# Patient Record
Sex: MALE | Race: WHITE | NOT HISPANIC OR LATINO | Employment: FULL TIME | URBAN - METROPOLITAN AREA
[De-identification: names, ages, dates, MRNs, and addresses within clinical notes are randomized per-mention and may not be internally consistent; named-entity substitution may affect disease eponyms.]

---

## 2017-04-15 ENCOUNTER — APPOINTMENT (OUTPATIENT)
Dept: GENERAL RADIOLOGY | Facility: HOSPITAL | Age: 37
End: 2017-04-15

## 2017-04-15 ENCOUNTER — HOSPITAL ENCOUNTER (EMERGENCY)
Facility: HOSPITAL | Age: 37
Discharge: HOME | End: 2017-04-15
Attending: EMERGENCY MEDICINE | Admitting: EMERGENCY MEDICINE

## 2017-04-15 VITALS
RESPIRATION RATE: 18 BRPM | WEIGHT: 175 LBS | OXYGEN SATURATION: 98 % | BODY MASS INDEX: 23.7 KG/M2 | TEMPERATURE: 98.3 F | SYSTOLIC BLOOD PRESSURE: 120 MMHG | HEART RATE: 66 BPM | HEIGHT: 72 IN | DIASTOLIC BLOOD PRESSURE: 80 MMHG

## 2017-04-15 DIAGNOSIS — J06.9 UPPER RESPIRATORY TRACT INFECTION, UNSPECIFIED TYPE: Primary | ICD-10-CM

## 2017-04-15 PROCEDURE — 99283 EMERGENCY DEPT VISIT LOW MDM: CPT

## 2017-04-15 PROCEDURE — 71020 HC CHEST PA AND LATERAL: CPT

## 2017-04-15 PROCEDURE — 93010 ELECTROCARDIOGRAM REPORT: CPT | Performed by: INTERNAL MEDICINE

## 2017-04-15 PROCEDURE — 93005 ELECTROCARDIOGRAM TRACING: CPT

## 2017-04-15 NOTE — ED PROVIDER NOTES
EMERGENCY DEPARTMENT ENCOUNTER    CHIEF COMPLAINT  Chief Complaint: URI sx  History given by: patient  History limited by: nothing   Room Number: 05/05  PMD: No Known Provider      HPI:  Pt is a 37 y.o. male who presents with URI sx onset 1 week ago.  Patient also complains of congestion, cough, post-nasal drip, and chest pain onset 2 days ago.  Patient denies recent long trips. Pt was seen at [facility] 1 week ago and was started on antihistamine and sudafed. He returned 2 days ago after onset of chest pain and was started on Augmentin. He has had one dose of abx. Pt states his chest pain has worsened, and the pain is exacerbated by certain positions and movements.   Past Medical History of cholecystectomy     Duration: 1 week  Timing: constant   Location: generalized   Radiation: does not radiate   Quality: new   Intensity/Severity: moderate   Progression: worsening   Associated Symptoms: cough, post-nasal drip, congestion, chest pain  Aggravating Factors: none specified   Alleviating Factors: none specified   Previous Episodes: Yes  Treatment before arrival: antihistamine, sudafed     PAST MEDICAL HISTORY  Active Ambulatory Problems     Diagnosis Date Noted   • No Active Ambulatory Problems     Resolved Ambulatory Problems     Diagnosis Date Noted   • No Resolved Ambulatory Problems     No Additional Past Medical History       PAST SURGICAL HISTORY  Past Surgical History:   Procedure Laterality Date   • CHOLECYSTECTOMY         FAMILY HISTORY  History reviewed. No pertinent family history.    SOCIAL HISTORY  Social History     Social History   • Marital status:      Spouse name: N/A   • Number of children: N/A   • Years of education: N/A     Occupational History   • Not on file.     Social History Main Topics   • Smoking status: Never Smoker   • Smokeless tobacco: Not on file   • Alcohol use Yes      Comment: social   • Drug use: No   • Sexual activity: Not on file     Other Topics Concern   • Not on file      Social History Narrative   • No narrative on file         ALLERGIES  Review of patient's allergies indicates no known allergies.    REVIEW OF SYSTEMS  Review of Systems   Constitutional: Negative for chills and fever.   HENT: Positive for congestion and postnasal drip. Negative for sore throat.    Respiratory: Positive for cough. Negative for shortness of breath.    Cardiovascular: Positive for chest pain.   Gastrointestinal: Negative for nausea and vomiting.   Genitourinary: Negative for dysuria.   Musculoskeletal: Negative for back pain.   Skin: Negative for rash.   Neurological: Negative for dizziness.   Psychiatric/Behavioral: The patient is not nervous/anxious.        PHYSICAL EXAM  ED Triage Vitals   Temp Heart Rate Resp BP SpO2   04/15/17 0651 04/15/17 0651 04/15/17 0651 04/15/17 0657 04/15/17 0651   97 °F (36.1 °C) 83 18 135/88 99 %      Temp src Heart Rate Source Patient Position BP Location FiO2 (%)   04/15/17 0651 04/15/17 0651 -- -- --   Tympanic Monitor          Physical Exam   Constitutional: He is well-developed, well-nourished, and in no distress. No distress.   HENT:   Head: Atraumatic.   Mouth/Throat: Mucous membranes are normal.   Eyes: No scleral icterus.   Neck: Normal range of motion.   Cardiovascular: Normal rate, regular rhythm and normal heart sounds.    Pulmonary/Chest: Effort normal and breath sounds normal.   Musculoskeletal: Normal range of motion.   Neurological: He is alert.   Skin: Skin is warm and dry.   Psychiatric: Mood and affect normal.   Nursing note and vitals reviewed.      RADIOLOGY  XR Chest 2 View   Final Result   No focal pulmonary consolidation. Follow-up as clinical   indications persist.       This report was finalized on 4/15/2017 9:25 AM by Dr. Cristian Harley MD.            I ordered the above noted radiological studies and reviewed the images on the PACS system.       EKG    ekg was interpreted by Dr. Jimenez, see his note for interpretation      PROGRESS  "AND CONSULTS    0958: Rechecked pt. Pt is resting comfortably. Discussed unremarkable workup and plan for discharge. Pt understands and agrees with plan, and all questions were addressed.     1036: Reviewed pt's history and workup with Dr. Jimenez.  At bedside evaluation, they agree with the plan of care.    Reviewed implications of results, diagnosis, meds, responsibility to follow up, warning signs and symptoms of possible worsening, potential complications and reasons to return to ER with patient.  Discussed all results and noted any abnormalities with patient.  Discussed absolute need to recheck abnormalities with PCP.    Discussed plan for discharge, as there is no emergent indication for admission.  Pt is agreeable and understands need for follow up and repeat testing.  Pt is aware that discharge does not mean that nothing is wrong but it indicates no emergency is present.  Pt is discharged with instructions to follow up with primary care doctor to have their blood pressure rechecked.       DIAGNOSIS  Final diagnoses:   Upper respiratory tract infection, unspecified type       FOLLOW UP   [facility] REFERRAL SERVICE  [facility] [address] [phone]           COURSE & MEDICAL DECISION MAKING  Pertinent Imaging studies that were ordered and reviewed are noted above.  Results were reviewed/discussed with the patient and they were also made aware of online assess.     MEDICATIONS GIVEN IN ER  Medications - No data to display    /80  Pulse 66  Temp 98.3 °F (36.8 °C) (Oral)   Resp 18  Ht 72\" (182.9 cm)  Wt 175 lb (79.4 kg)  SpO2 98%  BMI 23.73 kg/m2      I personally reviewed the past medical history, past surgical history, social history, family history, current medications and allergies as they appear in this chart.  The scribe's note accurately reflects the work and decisions made by me.     I personally scribed for ZAFAR Bajwa on 4/15/2017 at 11:15 AM.  " Electronically signed by Yadi Renteria on 4/15/2017 at time 10:42 AM                Yadi Renteria  04/15/17 1115       Thao Lantigua, APRN  04/15/17 1129

## 2017-04-15 NOTE — ED PROVIDER NOTES
Pt presents to ED c/o of upper respiratory sx with chest pain onset 1 week which has progressively worsened. Pt was seen at an urgent care center about a week ago for the same, and returned two days ago at which point he was prescribed Augmentin. He did not receive a CXR there and is now concerned for pneumonia.    On exam pt appears alert and oriented, ANO x3, no distress, and RRR. CXR is negative acute, he is already prescribed Augmentin. I agree w/ plan to discharge.    EKG  EKG time: 0708  Rhythm/Rate: NSR, rate 70  No Acute Ischemia  Non-Specific ST-T changes  No old for comparison  CXR is negative  Interpreted Contemporaneously by me.  Independently viewed by me    I supervised care provided by the midlevel provider.    We have discussed this patient's history, physical exam, and treatment plan.   I have reviewed the note and personally saw and examined the patient and agree with the plan of care.    Documentation assistance provided by andrade Wooten for Dr. Jimenez.  Information recorded by the scribe was done at my direction and has been verified and validated by me.    Documentation assistance provided by andrade Machado for Dr. Jimenez.  Information recorded by the scribe was done at my direction and has been verified and validated by me.     Stephani Wooten  04/15/17 1050       Clemente Machado  04/15/17 1118       Ishmael Jimenez MD  04/15/17 1219

## 2017-04-15 NOTE — DISCHARGE INSTRUCTIONS
Continue current home medications, complete Augmentin  Rest, increase fluids  Tylenol or Motrin as needed  Follow up with PMD or clinic of choice in 5-7 days for recheck  Return to er for fever, vomiting,shortness of air, chest pain, worsening cough, or any new or worsening symptoms

## 2018-07-17 ENCOUNTER — APPOINTMENT (OUTPATIENT)
Dept: GENERAL RADIOLOGY | Facility: HOSPITAL | Age: 38
End: 2018-07-17

## 2018-07-17 ENCOUNTER — HOSPITAL ENCOUNTER (EMERGENCY)
Facility: HOSPITAL | Age: 38
Discharge: HOME | End: 2018-07-17
Attending: FAMILY MEDICINE | Admitting: FAMILY MEDICINE

## 2018-07-17 VITALS
HEIGHT: 73 IN | RESPIRATION RATE: 18 BRPM | HEART RATE: 64 BPM | SYSTOLIC BLOOD PRESSURE: 119 MMHG | WEIGHT: 183.6 LBS | TEMPERATURE: 97.7 F | BODY MASS INDEX: 24.33 KG/M2 | DIASTOLIC BLOOD PRESSURE: 76 MMHG | OXYGEN SATURATION: 99 %

## 2018-07-17 DIAGNOSIS — R07.89 ATYPICAL CHEST PAIN: Primary | ICD-10-CM

## 2018-07-17 LAB
ALBUMIN SERPL-MCNC: 4.6 G/DL (ref 3.5–5.2)
ALBUMIN/GLOB SERPL: 2.2 G/DL
ALP SERPL-CCNC: 58 U/L (ref 39–117)
ALT SERPL W P-5'-P-CCNC: 20 U/L (ref 1–41)
ANION GAP SERPL CALCULATED.3IONS-SCNC: 12.2 MMOL/L
AST SERPL-CCNC: 16 U/L (ref 1–40)
BASOPHILS # BLD AUTO: 0.02 10*3/MM3 (ref 0–0.2)
BASOPHILS NFR BLD AUTO: 0.3 % (ref 0–1.5)
BILIRUB SERPL-MCNC: 0.3 MG/DL (ref 0.1–1.2)
BUN BLD-MCNC: 11 MG/DL (ref 6–20)
BUN/CREAT SERPL: 13.9 (ref 7–25)
CALCIUM SPEC-SCNC: 8.7 MG/DL (ref 8.6–10.5)
CHLORIDE SERPL-SCNC: 102 MMOL/L (ref 98–107)
CO2 SERPL-SCNC: 26.8 MMOL/L (ref 22–29)
CREAT BLD-MCNC: 0.79 MG/DL (ref 0.76–1.27)
DEPRECATED RDW RBC AUTO: 42.3 FL (ref 37–54)
EOSINOPHIL # BLD AUTO: 0.15 10*3/MM3 (ref 0–0.7)
EOSINOPHIL NFR BLD AUTO: 2.4 % (ref 0.3–6.2)
ERYTHROCYTE [DISTWIDTH] IN BLOOD BY AUTOMATED COUNT: 13.2 % (ref 11.5–14.5)
GFR SERPL CREATININE-BSD FRML MDRD: 111 ML/MIN/1.73
GLOBULIN UR ELPH-MCNC: 2.1 GM/DL
GLUCOSE BLD-MCNC: 89 MG/DL (ref 65–99)
HCT VFR BLD AUTO: 43.4 % (ref 40.4–52.2)
HGB BLD-MCNC: 14.1 G/DL (ref 13.7–17.6)
IMM GRANULOCYTES # BLD: 0 10*3/MM3 (ref 0–0.03)
IMM GRANULOCYTES NFR BLD: 0 % (ref 0–0.5)
LYMPHOCYTES # BLD AUTO: 1.9 10*3/MM3 (ref 0.9–4.8)
LYMPHOCYTES NFR BLD AUTO: 30.4 % (ref 19.6–45.3)
MCH RBC QN AUTO: 28.8 PG (ref 27–32.7)
MCHC RBC AUTO-ENTMCNC: 32.5 G/DL (ref 32.6–36.4)
MCV RBC AUTO: 88.6 FL (ref 79.8–96.2)
MONOCYTES # BLD AUTO: 0.46 10*3/MM3 (ref 0.2–1.2)
MONOCYTES NFR BLD AUTO: 7.3 % (ref 5–12)
NEUTROPHILS # BLD AUTO: 3.73 10*3/MM3 (ref 1.9–8.1)
NEUTROPHILS NFR BLD AUTO: 59.6 % (ref 42.7–76)
PLATELET # BLD AUTO: 156 10*3/MM3 (ref 140–500)
PMV BLD AUTO: 11.4 FL (ref 6–12)
POTASSIUM BLD-SCNC: 3.7 MMOL/L (ref 3.5–5.2)
PROT SERPL-MCNC: 6.7 G/DL (ref 6–8.5)
RBC # BLD AUTO: 4.9 10*6/MM3 (ref 4.6–6)
SODIUM BLD-SCNC: 141 MMOL/L (ref 136–145)
TROPONIN T SERPL-MCNC: <0.01 NG/ML (ref 0–0.03)
WBC NRBC COR # BLD: 6.26 10*3/MM3 (ref 4.5–10.7)

## 2018-07-17 PROCEDURE — 71046 X-RAY EXAM CHEST 2 VIEWS: CPT

## 2018-07-17 PROCEDURE — 85025 COMPLETE CBC W/AUTO DIFF WBC: CPT | Performed by: FAMILY MEDICINE

## 2018-07-17 PROCEDURE — 80053 COMPREHEN METABOLIC PANEL: CPT | Performed by: FAMILY MEDICINE

## 2018-07-17 PROCEDURE — 93010 ELECTROCARDIOGRAM REPORT: CPT | Performed by: INTERNAL MEDICINE

## 2018-07-17 PROCEDURE — 84484 ASSAY OF TROPONIN QUANT: CPT | Performed by: FAMILY MEDICINE

## 2018-07-17 PROCEDURE — 99284 EMERGENCY DEPT VISIT MOD MDM: CPT

## 2018-07-17 PROCEDURE — 93005 ELECTROCARDIOGRAM TRACING: CPT | Performed by: FAMILY MEDICINE

## 2018-07-17 RX ORDER — LORAZEPAM 0.5 MG/1
0.5 TABLET ORAL EVERY 8 HOURS PRN
Qty: 12 TABLET | Refills: 0 | Status: SHIPPED | OUTPATIENT
Start: 2018-07-17 | End: 2018-10-02

## 2018-07-17 RX ORDER — FAMOTIDINE 20 MG/1
20 TABLET, FILM COATED ORAL 2 TIMES DAILY
COMMUNITY
End: 2018-10-02

## 2018-07-17 RX ORDER — ALPRAZOLAM 0.25 MG/1
0.5 TABLET ORAL ONCE
Status: COMPLETED | OUTPATIENT
Start: 2018-07-17 | End: 2018-07-17

## 2018-07-17 RX ORDER — IBUPROFEN 600 MG/1
600 TABLET ORAL EVERY 6 HOURS PRN
COMMUNITY
End: 2018-10-02

## 2018-07-17 RX ADMIN — ALPRAZOLAM 0.5 MG: 0.25 TABLET ORAL at 01:08

## 2018-08-17 ENCOUNTER — OFFICE VISIT CONVERTED (OUTPATIENT)
Dept: INTERNAL MEDICINE | Facility: CLINIC | Age: 38
End: 2018-08-17
Attending: INTERNAL MEDICINE

## 2018-09-13 ENCOUNTER — OFFICE VISIT CONVERTED (OUTPATIENT)
Dept: INTERNAL MEDICINE | Facility: CLINIC | Age: 38
End: 2018-09-13
Attending: INTERNAL MEDICINE

## 2018-10-01 ENCOUNTER — OFFICE VISIT CONVERTED (OUTPATIENT)
Dept: INTERNAL MEDICINE | Facility: CLINIC | Age: 38
End: 2018-10-01
Attending: INTERNAL MEDICINE

## 2018-10-02 ENCOUNTER — APPOINTMENT (OUTPATIENT)
Dept: GENERAL RADIOLOGY | Facility: HOSPITAL | Age: 38
End: 2018-10-02

## 2018-10-02 ENCOUNTER — HOSPITAL ENCOUNTER (EMERGENCY)
Facility: HOSPITAL | Age: 38
Discharge: HOME | End: 2018-10-02
Attending: EMERGENCY MEDICINE | Admitting: EMERGENCY MEDICINE

## 2018-10-02 VITALS
WEIGHT: 175 LBS | BODY MASS INDEX: 23.19 KG/M2 | RESPIRATION RATE: 16 BRPM | HEIGHT: 73 IN | SYSTOLIC BLOOD PRESSURE: 124 MMHG | TEMPERATURE: 97.7 F | HEART RATE: 69 BPM | DIASTOLIC BLOOD PRESSURE: 84 MMHG | OXYGEN SATURATION: 100 %

## 2018-10-02 DIAGNOSIS — R07.89 ATYPICAL CHEST PAIN: Primary | ICD-10-CM

## 2018-10-02 DIAGNOSIS — K29.00 ACUTE GASTRITIS WITHOUT HEMORRHAGE, UNSPECIFIED GASTRITIS TYPE: ICD-10-CM

## 2018-10-02 LAB
ALBUMIN SERPL-MCNC: 4.8 G/DL (ref 3.5–5.2)
ALBUMIN/GLOB SERPL: 1.8 G/DL
ALP SERPL-CCNC: 70 U/L (ref 39–117)
ALT SERPL W P-5'-P-CCNC: 22 U/L (ref 1–41)
ANION GAP SERPL CALCULATED.3IONS-SCNC: 11 MMOL/L
AST SERPL-CCNC: 16 U/L (ref 1–40)
BASOPHILS # BLD AUTO: 0.01 10*3/MM3 (ref 0–0.2)
BASOPHILS NFR BLD AUTO: 0.2 % (ref 0–1.5)
BILIRUB SERPL-MCNC: 0.6 MG/DL (ref 0.1–1.2)
BUN BLD-MCNC: 16 MG/DL (ref 6–20)
BUN/CREAT SERPL: 18.2 (ref 7–25)
CALCIUM SPEC-SCNC: 9.4 MG/DL (ref 8.6–10.5)
CHLORIDE SERPL-SCNC: 101 MMOL/L (ref 98–107)
CO2 SERPL-SCNC: 26 MMOL/L (ref 22–29)
CREAT BLD-MCNC: 0.88 MG/DL (ref 0.76–1.27)
DEPRECATED RDW RBC AUTO: 40.8 FL (ref 37–54)
EOSINOPHIL # BLD AUTO: 0.06 10*3/MM3 (ref 0–0.7)
EOSINOPHIL NFR BLD AUTO: 1.1 % (ref 0.3–6.2)
ERYTHROCYTE [DISTWIDTH] IN BLOOD BY AUTOMATED COUNT: 12.8 % (ref 11.5–14.5)
GFR SERPL CREATININE-BSD FRML MDRD: 98 ML/MIN/1.73
GLOBULIN UR ELPH-MCNC: 2.6 GM/DL
GLUCOSE BLD-MCNC: 105 MG/DL (ref 65–99)
HCT VFR BLD AUTO: 45.4 % (ref 40.4–52.2)
HGB BLD-MCNC: 15.4 G/DL (ref 13.7–17.6)
HOLD SPECIMEN: NORMAL
HOLD SPECIMEN: NORMAL
IMM GRANULOCYTES # BLD: 0.01 10*3/MM3 (ref 0–0.03)
IMM GRANULOCYTES NFR BLD: 0.2 % (ref 0–0.5)
LYMPHOCYTES # BLD AUTO: 1.21 10*3/MM3 (ref 0.9–4.8)
LYMPHOCYTES NFR BLD AUTO: 22.4 % (ref 19.6–45.3)
MCH RBC QN AUTO: 29.6 PG (ref 27–32.7)
MCHC RBC AUTO-ENTMCNC: 33.9 G/DL (ref 32.6–36.4)
MCV RBC AUTO: 87.3 FL (ref 79.8–96.2)
MONOCYTES # BLD AUTO: 0.38 10*3/MM3 (ref 0.2–1.2)
MONOCYTES NFR BLD AUTO: 7.1 % (ref 5–12)
NEUTROPHILS # BLD AUTO: 3.73 10*3/MM3 (ref 1.9–8.1)
NEUTROPHILS NFR BLD AUTO: 69.2 % (ref 42.7–76)
PLATELET # BLD AUTO: 180 10*3/MM3 (ref 140–500)
PMV BLD AUTO: 11.7 FL (ref 6–12)
POTASSIUM BLD-SCNC: 4.3 MMOL/L (ref 3.5–5.2)
PROT SERPL-MCNC: 7.4 G/DL (ref 6–8.5)
RBC # BLD AUTO: 5.2 10*6/MM3 (ref 4.6–6)
SODIUM BLD-SCNC: 138 MMOL/L (ref 136–145)
TROPONIN T SERPL-MCNC: <0.01 NG/ML (ref 0–0.03)
WBC NRBC COR # BLD: 5.39 10*3/MM3 (ref 4.5–10.7)
WHOLE BLOOD HOLD SPECIMEN: NORMAL
WHOLE BLOOD HOLD SPECIMEN: NORMAL

## 2018-10-02 PROCEDURE — 93005 ELECTROCARDIOGRAM TRACING: CPT

## 2018-10-02 PROCEDURE — 99283 EMERGENCY DEPT VISIT LOW MDM: CPT

## 2018-10-02 PROCEDURE — 80053 COMPREHEN METABOLIC PANEL: CPT | Performed by: PHYSICIAN ASSISTANT

## 2018-10-02 PROCEDURE — 93010 ELECTROCARDIOGRAM REPORT: CPT | Performed by: INTERNAL MEDICINE

## 2018-10-02 PROCEDURE — 85025 COMPLETE CBC W/AUTO DIFF WBC: CPT | Performed by: PHYSICIAN ASSISTANT

## 2018-10-02 PROCEDURE — 84484 ASSAY OF TROPONIN QUANT: CPT | Performed by: PHYSICIAN ASSISTANT

## 2018-10-02 PROCEDURE — 93005 ELECTROCARDIOGRAM TRACING: CPT | Performed by: EMERGENCY MEDICINE

## 2018-10-02 PROCEDURE — 71046 X-RAY EXAM CHEST 2 VIEWS: CPT

## 2018-10-02 RX ORDER — SUCRALFATE 1 G/1
1 TABLET ORAL 4 TIMES DAILY
Qty: 16 TABLET | Refills: 0 | Status: SHIPPED | OUTPATIENT
Start: 2018-10-02 | End: 2021-07-12

## 2018-10-02 RX ORDER — OMEPRAZOLE 40 MG/1
40 CAPSULE, DELAYED RELEASE ORAL DAILY
Qty: 30 CAPSULE | Refills: 0 | Status: SHIPPED | OUTPATIENT
Start: 2018-10-02 | End: 2021-07-12

## 2018-10-02 RX ORDER — SODIUM CHLORIDE 0.9 % (FLUSH) 0.9 %
10 SYRINGE (ML) INJECTION AS NEEDED
Status: DISCONTINUED | OUTPATIENT
Start: 2018-10-02 | End: 2018-10-02 | Stop reason: HOSPADM

## 2018-10-02 NOTE — ED TRIAGE NOTES
Pt reports left sided anterior chest pain x1 month, he reports that last night it worsened. The patient reports that it has moved to his left axilla.

## 2018-10-02 NOTE — ED PROVIDER NOTES
" EMERGENCY DEPARTMENT ENCOUNTER    CHIEF COMPLAINT  Chief Complaint: CP  History given by: patient  History limited by: nothing  Room Number: 40/40  PMD: Allison Maurice MD      HPI:  Pt is a 38 y.o. male who presents complaining of waxing and waning anterior sternal chest pressure for the last month. Pt states that the discomfort radiated to his back. Pt states that the discomfort is worse when lying supine at night and is occasionally relieved with belching. Pt states that his CP became worse and localized to his left anterior chest last night but states that it never occurs while the patient is running, which she does several times a week 3-4 miles.  Pt states that he developed left axillary pain around 0900 this morning and states that he had an intermittent \"warm sensation\" in his LUE. Pt also complains of intermittent SOA since his discomfort began but that he has not had any in the last week. Pt denies cough, cold, fever, urinary symptoms, constipation, N/V or illicit drug use other than marijuana use. Pt denies family history of sudden death, CAD, cardiac stents or MI. Pt states that his PCP recently started him on Nexium for 2 weeks without relief.    Duration:  One month  Onset: gradual  Timing: waxing and waning  Location: originally anterior sternum, now left anterior chest  Radiation: none  Quality: pressure  Intensity/Severity: moderate  Progression: worsening  Associated Symptoms: SOA, left axillary discomfort, \"warm sensation\" in LUE  Aggravating Factors: lying supine at night  Alleviating Factors: belching  Previous Episodes: none mentioned    PAST MEDICAL HISTORY  Active Ambulatory Problems     Diagnosis Date Noted   • No Active Ambulatory Problems     Resolved Ambulatory Problems     Diagnosis Date Noted   • No Resolved Ambulatory Problems     No Additional Past Medical History       PAST SURGICAL HISTORY  Past Surgical History:   Procedure Laterality Date   • CHOLECYSTECTOMY   "       FAMILY HISTORY  History reviewed. No pertinent family history.    SOCIAL HISTORY  Social History     Social History   • Marital status:      Spouse name: N/A   • Number of children: N/A   • Years of education: N/A     Occupational History   • Not on file.     Social History Main Topics   • Smoking status: Never Smoker   • Smokeless tobacco: Never Used   • Alcohol use Yes      Comment: social   • Drug use: No   • Sexual activity: Not on file     Other Topics Concern   • Not on file     Social History Narrative   • No narrative on file       ALLERGIES  Patient has no known allergies.    REVIEW OF SYSTEMS  Review of Systems   Constitutional: Negative for chills and fever.   HENT: Negative for sore throat and trouble swallowing.    Eyes: Negative for visual disturbance.   Respiratory: Positive for shortness of breath (none in the last week). Negative for cough.    Cardiovascular: Positive for chest pain. Negative for leg swelling.   Gastrointestinal: Negative for abdominal pain, diarrhea and vomiting.   Endocrine: Negative.    Genitourinary: Negative for decreased urine volume and frequency.   Musculoskeletal: Positive for myalgias (left axillary pain). Negative for neck pain.        (+) warm sensation in LUE   Skin: Negative for rash.   Allergic/Immunologic: Negative.    Neurological: Negative for weakness and numbness.   Hematological: Negative.    Psychiatric/Behavioral: Negative.    All other systems reviewed and are negative.      PHYSICAL EXAM  ED Triage Vitals   Temp Heart Rate Resp BP SpO2   10/02/18 1256 10/02/18 1256 10/02/18 1310 10/02/18 1310 10/02/18 1256   97.7 °F (36.5 °C) 69 16 124/84 100 %      Temp src Heart Rate Source Patient Position BP Location FiO2 (%)   10/02/18 1256 -- -- -- --   Tympanic           Physical Exam   Constitutional: He is oriented to person, place, and time. He appears distressed.   HENT:   Head: Normocephalic and atraumatic.   Eyes: EOM are normal.   Neck: Normal  range of motion.   Cardiovascular: Normal rate, regular rhythm and normal heart sounds.    No murmur heard.  Pulses:       Radial pulses are 2+ on the right side, and 2+ on the left side.        Posterior tibial pulses are 2+ on the right side, and 2+ on the left side.   Pulmonary/Chest: Effort normal and breath sounds normal. No respiratory distress. He has no wheezes. He exhibits no tenderness.   Abdominal: Soft. Bowel sounds are normal. There is no tenderness. There is no rebound and no guarding.   Musculoskeletal: Normal range of motion. He exhibits no edema.   Neurological: He is alert and oriented to person, place, and time. He has normal sensation and normal strength.   Skin: Skin is warm and dry.   Psychiatric: Affect normal.   Nursing note and vitals reviewed.      LAB RESULTS  Lab Results (last 24 hours)     Procedure Component Value Units Date/Time    CBC & Differential [[id number]] Collected:  10/02/18 1312    Specimen:  Blood Updated:  10/02/18 1337    Narrative:       The following orders were created for panel order CBC & Differential.  Procedure                               Abnormality         Status                     ---------                               -----------         ------                     CBC Auto Differential[[id number]]         Normal              Final result                 Please view results for these tests on the individual orders.    Comprehensive Metabolic Panel [[id number]]  (Abnormal) Collected:  10/02/18 1312    Specimen:  Blood Updated:  10/02/18 1340     Glucose 105 (H) mg/dL      BUN 16 mg/dL      Creatinine 0.88 mg/dL      Sodium 138 mmol/L      Potassium 4.3 mmol/L      Chloride 101 mmol/L      CO2 26.0 mmol/L      Calcium 9.4 mg/dL      Total Protein 7.4 g/dL      Albumin 4.80 g/dL      ALT (SGPT) 22 U/L      AST (SGOT) 16 U/L      Alkaline Phosphatase 70 U/L      Total Bilirubin 0.6 mg/dL      eGFR Non African Amer 98 mL/min/1.73      Globulin 2.6 gm/dL      A/G Ratio  1.8 g/dL      BUN/Creatinine Ratio 18.2     Anion Gap 11.0 mmol/L     Troponin [[id number]]  (Normal) Collected:  10/02/18 1312    Specimen:  Blood Updated:  10/02/18 1340     Troponin T <0.010 ng/mL     Narrative:       Troponin T Reference Ranges:  Less than 0.03 ng/mL:    Negative for AMI  0.03 to 0.09 ng/mL:      Indeterminant for AMI  Greater than 0.09 ng/mL: Positive for AMI    CBC Auto Differential [[id number]]  (Normal) Collected:  10/02/18 1312    Specimen:  Blood Updated:  10/02/18 1337     WBC 5.39 10*3/mm3      RBC 5.20 10*6/mm3      Hemoglobin 15.4 g/dL      Hematocrit 45.4 %      MCV 87.3 fL      MCH 29.6 pg      MCHC 33.9 g/dL      RDW 12.8 %      RDW-SD 40.8 fl      MPV 11.7 fL      Platelets 180 10*3/mm3      Neutrophil % 69.2 %      Lymphocyte % 22.4 %      Monocyte % 7.1 %      Eosinophil % 1.1 %      Basophil % 0.2 %      Immature Grans % 0.2 %      Neutrophils, Absolute 3.73 10*3/mm3      Lymphocytes, Absolute 1.21 10*3/mm3      Monocytes, Absolute 0.38 10*3/mm3      Eosinophils, Absolute 0.06 10*3/mm3      Basophils, Absolute 0.01 10*3/mm3      Immature Grans, Absolute 0.01 10*3/mm3           I ordered the above labs and reviewed the results    RADIOLOGY  XR Chest 2 View   Final Result   No interval change or evidence for active disease in the   chest.       This report was finalized on 10/2/2018 2:35 PM by Dr. Ishmael Chatterjee M.D.               I ordered the above noted radiological studies. Interpreted by radiologist. Reviewed by me in PACS.       PROCEDURES  Procedures  EKG           EKG time: 1300  Rhythm/Rate: sinus bradycardia, 50's  P waves and OK: LAE, normal JASON  QRS, axis: unremarkable   ST and T waves: ST prominence diffusely (suspect early repolarization)     Interpreted Contemporaneously by me, independently viewed  unchanged compared to prior on 7/17/18      PROGRESS AND CONSULTS  ED Course as of Oct 02 1655   Tue Oct 02, 2018   1317 Left chest pressure x 1 month, sharp throb last  night, today discomfort in left axilla and LUE.  [KA]      ED Course User Index  [KA] Odessa Escobar PA     1604- Notified pt of his unremarkable lab results and his unchanged EKG. D/w pt that he is at low risk for CAD and that his pain is very unlikely to be cardiac in etiology. D/w pt that his symptoms seem to be more GI in etiology but that he may have pleurisy as well. Discussed the plan to discharge the pt home with prescriptions for carafate and prilosec and referrals to GI and cardiology. Pt understands and agrees with the plan, all questions answered.        MEDICAL DECISION MAKING  Results were reviewed/discussed with the patient and they were also made aware of online access. Pt also made aware that some labs, such as cultures, will not be resulted during ER visit and follow up with PMD is necessary.     MDM  Number of Diagnoses or Management Options     Amount and/or Complexity of Data Reviewed  Clinical lab tests: reviewed and ordered (Troponin=<0.010)  Tests in the radiology section of CPT®: ordered and reviewed (CXR shows nothing acute)  Tests in the medicine section of CPT®: ordered and reviewed (See EKG procedure note)  Decide to obtain previous medical records or to obtain history from someone other than the patient: yes  Review and summarize past medical records: yes (Pt was last seen in the ED on 7/17/18 for atypical chest pain. Pt had a negative cardiac work up at that time.)  Independent visualization of images, tracings, or specimens: yes    Patient Progress  Patient progress: stable        HEART Score (for prediction of 6-week risk of major adverse cardiac event) reviewed and/or performed as part of the patient evaluation and treatment planning process.  The result associated with this review/performance is: 1      DIAGNOSIS  Final diagnoses:   Atypical chest pain   Acute gastritis without hemorrhage, unspecified gastritis type       DISPOSITION  DISCHARGE    Patient discharged in stable  condition.    Reviewed implications of results, diagnosis, meds, responsibility to follow up, warning signs and symptoms of possible worsening, potential complications and reasons to return to ER.    Patient/Family voiced understanding of above instructions.    Discussed plan for discharge, as there is no emergent indication for admission. Patient referred to primary care provider for BP management due to today's BP. Pt/family is agreeable and understands need for follow up and repeat testing.  Pt is aware that discharge does not mean that nothing is wrong but it indicates no emergency is present that requires admission and they must continue care with follow-up as given below or physician of their choice.     FOLLOW-UP  Allison Maurice MD  [address]  [address]  [facility] [id number] [phone]    Schedule an appointment as soon as possible for a visit in 2 days  for further evaluation    [facility] CARDIOLOGY  [address]  [facility] [address] [phone]  Call in 3 days  As needed, If symptoms worsen    Carlos Brewer MD  [address]  [facility] [id number] [phone]    Schedule an appointment as soon as possible for a visit in 1 week           Medication List      New Prescriptions    omeprazole 40 MG capsule  Commonly known as:  priLOSEC  Take 1 capsule by mouth Daily.     sucralfate 1 g tablet  Commonly known as:  CARAFATE  Take 1 tablet by mouth 4 (Four) Times a Day.        Stop    famotidine 20 MG tablet  Commonly known as:  PEPCID     ibuprofen 600 MG tablet  Commonly known as:  ADVIL,MOTRIN     LORazepam 0.5 MG tablet  Commonly known as:  ATIVAN              Latest Documented Vital Signs:  As of 4:55 PM  BP- 124/84 HR- 69 Temp- 97.7 °F (36.5 °C) (Tympanic) O2 sat- 100%    --  Documentation assistance provided by andrade Shaikh for Dr. Carrillo.  Information recorded by the andrade was done at my direction and has  been verified and validated by me.     Sara Shaikh  10/02/18 1655       Charley Carrillo MD  10/03/18 1735

## 2018-10-02 NOTE — DISCHARGE INSTRUCTIONS
You are advised to follow closely with Dr. Maurice in 2-3 days for recheck, final results of lab work and imaging testing, and further testing/treatment as needed.    Avoid eating tomotoes, citrus, mint, caffeine, spicy or greasy food. Continue taking probiotics.    Please return to the emergency department immediately with chest pain different than usual for you, shortness of air, abdominal pain, persistent vomiting/fever, blood in emesis or stool, lightheadedness/fainting, problems with speech, one sided weakness/numbness, new incontinence, problems with vision, or for worsening of symptoms or other concerns.

## 2018-10-16 ENCOUNTER — OFFICE VISIT (OUTPATIENT)
Dept: CARDIOLOGY | Facility: CLINIC | Age: 38
End: 2018-10-16

## 2018-10-16 ENCOUNTER — OFFICE (OUTPATIENT)
Dept: URBAN - METROPOLITAN AREA CLINIC 66 | Facility: CLINIC | Age: 38
End: 2018-10-16

## 2018-10-16 VITALS
WEIGHT: 178 LBS | HEIGHT: 73 IN | DIASTOLIC BLOOD PRESSURE: 80 MMHG | SYSTOLIC BLOOD PRESSURE: 112 MMHG | HEART RATE: 61 BPM | BODY MASS INDEX: 23.59 KG/M2

## 2018-10-16 VITALS
SYSTOLIC BLOOD PRESSURE: 116 MMHG | DIASTOLIC BLOOD PRESSURE: 76 MMHG | HEART RATE: 64 BPM | HEIGHT: 73 IN | WEIGHT: 179 LBS

## 2018-10-16 DIAGNOSIS — R14.2 ERUCTATION: ICD-10-CM

## 2018-10-16 DIAGNOSIS — R07.2 PRECORDIAL PAIN: Primary | ICD-10-CM

## 2018-10-16 DIAGNOSIS — R07.89 OTHER CHEST PAIN: ICD-10-CM

## 2018-10-16 PROCEDURE — 99203 OFFICE O/P NEW LOW 30 MIN: CPT | Performed by: INTERNAL MEDICINE

## 2018-10-16 PROCEDURE — 99203 OFFICE O/P NEW LOW 30 MIN: CPT | Performed by: NURSE PRACTITIONER

## 2018-10-16 RX ORDER — MELOXICAM 15 MG/1
15 TABLET ORAL DAILY
Refills: 2 | COMMUNITY
Start: 2018-10-01 | End: 2021-07-12

## 2018-10-16 RX ORDER — ERGOCALCIFEROL 1.25 MG/1
50000 CAPSULE ORAL WEEKLY
Refills: 0 | COMMUNITY
Start: 2018-10-12 | End: 2021-07-12

## 2018-10-16 NOTE — PROGRESS NOTES
Subjective:     Encounter Date:10/16/2018      Patient ID: Enoch Goddard is a 38 y.o. male.    Chief Complaint: 10/16/2018  History of Present Illness    Dear Dr. Maurice,    Had the pleasure of seeing this patient in the office today for initial evaluation and consultation in follow-up from the emergency room visit on October 2.  This patient was seen twice in the emergency room with epigastric and substernal discomfort.  This is a dull burning discomfort that is been present the vast majority of the time for the last 2 months.  He thinks that maybe he had a total of 7 days where he did not have this discomfort.  The rest the time, he's had discomfort essentially all day long, sometimes worse and sometimes better.  It is not associated with activity or exercise.  He runs up to 3 or 4 miles and does not have any problem.  He also kayaks and that does not bring on the discomfort.  There is no chest wall tenderness.  No change with deep breath or cough.  He given omeprazole and that did not seem to modify the pain.  When he was in the emergency room recently he was given Carafate but his understanding was that he was supposed to stay off that medicine until he was seen by GI.  His appointment is this afternoon.    This patient has not experienced any feeling of palpitations, tachycardia or heart racing and no presyncope or syncope.  There has not been any problems with dizziness or lightheadedness.  There has not been any orthopnea or PND, and no problems with lower extremity edema.  This patient denies any shortness of breath at rest or with activity and has not had any wheezing.  This patient has not had any problems with unexplained nausea or vomiting. The patient has continued to perform daily activities of living without any specific problem or change in the level of activity.  This patient has not been recently hospitalized for any reason.    This patient has no known cardiac history.  This patient has no  history of coronary artery disease, congestive heart failure, rheumatic fever, rheumatic heart disease, congenital heart disease or heart murmur.  This patient has never required invasive cardiovascular evaluation.    The following portions of the patient's history were reviewed and updated as appropriate: allergies, current medications, past family history, past medical history, past social history, past surgical history and problem list.    Past Medical History:   Diagnosis Date   • Acute gastritis without hemorrhage    • Atypical chest pain        Past Surgical History:   Procedure Laterality Date   • CHOLECYSTECTOMY         Social History     Social History   • Marital status:      Spouse name: N/A   • Number of children: N/A   • Years of education: N/A     Occupational History   • Not on file.     Social History Main Topics   • Smoking status: Never Smoker   • Smokeless tobacco: Never Used      Comment: caff use   • Alcohol use Yes      Comment: social   • Drug use: No   • Sexual activity: Not on file     Other Topics Concern   • Not on file     Social History Narrative   • No narrative on file       Review of Systems   Constitution: Negative for chills, decreased appetite, fever and night sweats.   HENT: Negative for ear discharge, ear pain, hearing loss, nosebleeds and sore throat.    Eyes: Negative for blurred vision, double vision and pain.   Cardiovascular: Negative for cyanosis.   Respiratory: Negative for hemoptysis and sputum production.    Endocrine: Negative for cold intolerance and heat intolerance.   Hematologic/Lymphatic: Negative for adenopathy.   Skin: Negative for dry skin, itching, nail changes, rash and suspicious lesions.   Musculoskeletal: Negative for arthritis, gout, muscle cramps, muscle weakness, myalgias and neck pain.   Gastrointestinal: Negative for anorexia, bowel incontinence, constipation, diarrhea, dysphagia, hematemesis and jaundice.   Genitourinary: Negative for bladder  "incontinence, dysuria, flank pain, frequency, hematuria and nocturia.   Neurological: Negative for focal weakness, numbness, paresthesias and seizures.   Psychiatric/Behavioral: Negative for altered mental status, hallucinations, hypervigilance, suicidal ideas and thoughts of violence.   Allergic/Immunologic: Negative for persistent infections.       Procedures       Objective:     Vitals:    10/16/18 1055   BP: 112/80   Pulse: 61   Weight: 80.7 kg (178 lb)   Height: 185.4 cm (73\")         Physical Exam   Constitutional: He is oriented to person, place, and time. He appears well-developed and well-nourished. No distress.   HENT:   Head: Normocephalic and atraumatic.   Nose: Nose normal.   Mouth/Throat: Oropharynx is clear and moist.   Eyes: Pupils are equal, round, and reactive to light. Conjunctivae and EOM are normal. Right eye exhibits no discharge. Left eye exhibits no discharge.   Neck: Normal range of motion. Neck supple. No tracheal deviation present. No thyromegaly present.   Cardiovascular: Normal rate, regular rhythm, S1 normal, S2 normal, normal heart sounds and normal pulses.  Exam reveals no S3.    Pulmonary/Chest: Effort normal and breath sounds normal. No stridor. No respiratory distress. He exhibits no tenderness.   Abdominal: Soft. Bowel sounds are normal. He exhibits no distension and no mass. There is no tenderness. There is no rebound and no guarding.   Musculoskeletal: Normal range of motion. He exhibits no tenderness or deformity.   Lymphadenopathy:     He has no cervical adenopathy.   Neurological: He is alert and oriented to person, place, and time. He has normal reflexes.   Skin: Skin is warm and dry. No rash noted. He is not diaphoretic. No erythema.   Psychiatric: He has a normal mood and affect. Thought content normal.       Lab Review:             Performed        Assessment:          Diagnosis Plan   1. Precordial pain            Plan:       Chest discomfort-is not suggestive of a " cardiac etiology for chest discomfort.  He scheduled to follow-up with a gastroenterologist day, and I feel that is the appropriate next step.  I did ask for him to contact us if they're not successful in identifying the etiology of his chest discomfort and resolving it.  Thank you very much for allowing us to participate in the care of this pleasant patient.  Please don't hesitate to call if I can be of assistance in any way.      Current Outpatient Prescriptions:   •  meloxicam (MOBIC) 15 MG tablet, Take 15 mg by mouth Daily., Disp: , Rfl: 2  •  vitamin D (ERGOCALCIFEROL) 10068 units capsule capsule, Take 50,000 Units by mouth 1 (One) Time Per Week., Disp: , Rfl: 0  •  omeprazole (priLOSEC) 40 MG capsule, Take 1 capsule by mouth Daily., Disp: 30 capsule, Rfl: 0  •  sucralfate (CARAFATE) 1 g tablet, Take 1 tablet by mouth 4 (Four) Times a Day., Disp: 16 tablet, Rfl: 0         EMR Dragon/Transcription disclaimer:    Much of this encounter note is an electronic transcription/translation of spoken language to printed text. The electronic translation of spoken language may permit erroneous, or at times, nonsensical words or phrases to be inadvertently transcribed; Although I have reviewed the note for such errors, some may still exist.

## 2018-11-07 ENCOUNTER — OFFICE VISIT CONVERTED (OUTPATIENT)
Dept: INTERNAL MEDICINE | Facility: CLINIC | Age: 38
End: 2018-11-07
Attending: INTERNAL MEDICINE

## 2018-11-07 ENCOUNTER — CONVERSION ENCOUNTER (OUTPATIENT)
Dept: INTERNAL MEDICINE | Facility: CLINIC | Age: 38
End: 2018-11-07

## 2018-11-12 ENCOUNTER — OFFICE (OUTPATIENT)
Dept: URBAN - METROPOLITAN AREA PATHOLOGY 4 | Facility: PATHOLOGY | Age: 38
End: 2018-11-12

## 2018-11-12 ENCOUNTER — AMBULATORY SURGICAL CENTER (OUTPATIENT)
Dept: URBAN - METROPOLITAN AREA SURGERY 20 | Facility: SURGERY | Age: 38
End: 2018-11-12

## 2018-11-12 VITALS — HEIGHT: 73 IN

## 2018-11-12 DIAGNOSIS — K29.70 GASTRITIS, UNSPECIFIED, WITHOUT BLEEDING: ICD-10-CM

## 2018-11-12 DIAGNOSIS — K29.60 OTHER GASTRITIS WITHOUT BLEEDING: ICD-10-CM

## 2018-11-12 DIAGNOSIS — R14.2 ERUCTATION: ICD-10-CM

## 2018-11-12 DIAGNOSIS — R07.89 OTHER CHEST PAIN: ICD-10-CM

## 2018-11-12 DIAGNOSIS — K31.89 OTHER DISEASES OF STOMACH AND DUODENUM: ICD-10-CM

## 2018-11-12 DIAGNOSIS — K44.9 DIAPHRAGMATIC HERNIA WITHOUT OBSTRUCTION OR GANGRENE: ICD-10-CM

## 2018-11-12 LAB
GI HISTOLOGY: A. SELECT: (no result)
GI HISTOLOGY: PDF REPORT: (no result)

## 2018-11-12 PROCEDURE — 88305 TISSUE EXAM BY PATHOLOGIST: CPT | Performed by: INTERNAL MEDICINE

## 2018-11-12 PROCEDURE — 43239 EGD BIOPSY SINGLE/MULTIPLE: CPT | Performed by: INTERNAL MEDICINE

## 2019-03-08 ENCOUNTER — OFFICE VISIT CONVERTED (OUTPATIENT)
Dept: INTERNAL MEDICINE | Facility: CLINIC | Age: 39
End: 2019-03-08
Attending: PHYSICIAN ASSISTANT

## 2019-03-08 ENCOUNTER — CONVERSION ENCOUNTER (OUTPATIENT)
Dept: INTERNAL MEDICINE | Facility: CLINIC | Age: 39
End: 2019-03-08

## 2019-03-08 ENCOUNTER — HOSPITAL ENCOUNTER (OUTPATIENT)
Dept: OTHER | Facility: HOSPITAL | Age: 39
Discharge: HOME | End: 2019-03-08
Attending: PHYSICIAN ASSISTANT

## 2019-03-08 LAB
25(OH)D3 SERPL-MCNC: 22.8 NG/ML (ref 30–100)
ALBUMIN SERPL-MCNC: 4.9 G/DL (ref 3.5–5)
ALBUMIN/GLOB SERPL: 1.8 {RATIO} (ref 1.4–2.6)
ALP SERPL-CCNC: 72 U/L (ref 53–128)
ALT SERPL-CCNC: 21 U/L (ref 10–40)
ANION GAP SERPL CALC-SCNC: -9 MMOL/L (ref 8–19)
AST SERPL-CCNC: 18 U/L (ref 15–50)
BASOPHILS # BLD AUTO: 0.03 10*3/UL (ref 0–0.2)
BASOPHILS NFR BLD AUTO: 0.6 % (ref 0–3)
BILIRUB SERPL-MCNC: 0.57 MG/DL (ref 0.2–1.3)
BUN SERPL-MCNC: 13 MG/DL (ref 5–25)
BUN/CREAT SERPL: 14 {RATIO} (ref 6–20)
CALCIUM SERPL-MCNC: 10.1 MG/DL (ref 8.7–10.4)
CHLORIDE SERPL-SCNC: 112 MMOL/L (ref 99–111)
CONV ABS IMM GRAN: 0.01 10*3/UL (ref 0–0.2)
CONV CO2: 28 MMOL/L (ref 22–32)
CONV IMMATURE GRAN: 0.2 % (ref 0–1.8)
CONV TOTAL PROTEIN: 7.7 G/DL (ref 6.3–8.2)
CREAT UR-MCNC: 0.93 MG/DL (ref 0.7–1.2)
DEPRECATED RDW RBC AUTO: 41 FL (ref 35.1–43.9)
EOSINOPHIL # BLD AUTO: 0.11 10*3/UL (ref 0–0.7)
EOSINOPHIL # BLD AUTO: 2.1 % (ref 0–7)
ERYTHROCYTE [DISTWIDTH] IN BLOOD BY AUTOMATED COUNT: 12.8 % (ref 11.6–14.4)
GFR SERPLBLD BASED ON 1.73 SQ M-ARVRAT: >60 ML/MIN/{1.73_M2}
GLOBULIN UR ELPH-MCNC: 2.8 G/DL (ref 2–3.5)
GLUCOSE SERPL-MCNC: 63 MG/DL (ref 70–99)
HBA1C MFR BLD: 16.7 G/DL (ref 14–18)
HCT VFR BLD AUTO: 50.3 % (ref 42–52)
LYMPHOCYTES # BLD AUTO: 1.46 10*3/UL (ref 1–5)
MCH RBC QN AUTO: 29.2 PG (ref 27–31)
MCHC RBC AUTO-ENTMCNC: 33.2 G/DL (ref 33–37)
MCV RBC AUTO: 87.9 FL (ref 80–96)
MONOCYTES # BLD AUTO: 0.37 10*3/UL (ref 0.2–1.2)
MONOCYTES NFR BLD AUTO: 7 % (ref 3–10)
NEUTROPHILS # BLD AUTO: 3.3 10*3/UL (ref 2–8)
NEUTROPHILS NFR BLD AUTO: 62.4 % (ref 30–85)
NRBC CBCN: 0 % (ref 0–0.7)
OSMOLALITY SERPL CALC.SUM OF ELEC: 260 MOSM/KG (ref 273–304)
PLATELET # BLD AUTO: 184 10*3/UL (ref 130–400)
PMV BLD AUTO: 12.2 FL (ref 9.4–12.4)
POTASSIUM SERPL-SCNC: 4.6 MMOL/L (ref 3.5–5.3)
RBC # BLD AUTO: 5.72 10*6/UL (ref 4.7–6.1)
SODIUM SERPL-SCNC: 126 MMOL/L (ref 135–147)
VARIANT LYMPHS NFR BLD MANUAL: 27.7 % (ref 20–45)
WBC # BLD AUTO: 5.28 10*3/UL (ref 4.8–10.8)

## 2019-04-03 ENCOUNTER — HOSPITAL ENCOUNTER (OUTPATIENT)
Dept: OTHER | Facility: HOSPITAL | Age: 39
Discharge: HOME | End: 2019-04-03
Attending: PHYSICIAN ASSISTANT

## 2019-04-03 ENCOUNTER — OFFICE VISIT CONVERTED (OUTPATIENT)
Dept: INTERNAL MEDICINE | Facility: CLINIC | Age: 39
End: 2019-04-03
Attending: PHYSICIAN ASSISTANT

## 2019-04-03 LAB
ALBUMIN SERPL-MCNC: 5.1 G/DL (ref 3.5–5)
ALBUMIN/GLOB SERPL: 1.6 {RATIO} (ref 1.4–2.6)
ALP SERPL-CCNC: 73 U/L (ref 53–128)
ALT SERPL-CCNC: 26 U/L (ref 10–40)
ANION GAP SERPL CALC-SCNC: 17 MMOL/L (ref 8–19)
AST SERPL-CCNC: 22 U/L (ref 15–50)
BILIRUB SERPL-MCNC: 0.81 MG/DL (ref 0.2–1.3)
BUN SERPL-MCNC: 12 MG/DL (ref 5–25)
BUN/CREAT SERPL: 14 {RATIO} (ref 6–20)
CALCIUM SERPL-MCNC: 9.5 MG/DL (ref 8.7–10.4)
CHLORIDE SERPL-SCNC: 98 MMOL/L (ref 99–111)
CONV CO2: 29 MMOL/L (ref 22–32)
CONV TOTAL PROTEIN: 8.2 G/DL (ref 6.3–8.2)
CREAT UR-MCNC: 0.85 MG/DL (ref 0.7–1.2)
GFR SERPLBLD BASED ON 1.73 SQ M-ARVRAT: >60 ML/MIN/{1.73_M2}
GLOBULIN UR ELPH-MCNC: 3.1 G/DL (ref 2–3.5)
GLUCOSE SERPL-MCNC: 92 MG/DL (ref 70–99)
OSMOLALITY SERPL CALC.SUM OF ELEC: 289 MOSM/KG (ref 273–304)
POTASSIUM SERPL-SCNC: 4 MMOL/L (ref 3.5–5.3)
SODIUM SERPL-SCNC: 140 MMOL/L (ref 135–147)

## 2019-04-15 ENCOUNTER — OFFICE (OUTPATIENT)
Dept: URBAN - METROPOLITAN AREA CLINIC 66 | Facility: CLINIC | Age: 39
End: 2019-04-15

## 2019-04-15 VITALS
HEART RATE: 60 BPM | DIASTOLIC BLOOD PRESSURE: 70 MMHG | HEIGHT: 73 IN | WEIGHT: 175 LBS | SYSTOLIC BLOOD PRESSURE: 112 MMHG

## 2019-04-15 DIAGNOSIS — R10.84 GENERALIZED ABDOMINAL PAIN: ICD-10-CM

## 2019-04-15 DIAGNOSIS — K29.70 GASTRITIS, UNSPECIFIED, WITHOUT BLEEDING: ICD-10-CM

## 2019-04-15 DIAGNOSIS — K44.9 DIAPHRAGMATIC HERNIA WITHOUT OBSTRUCTION OR GANGRENE: ICD-10-CM

## 2019-04-15 PROCEDURE — 99213 OFFICE O/P EST LOW 20 MIN: CPT | Performed by: NURSE PRACTITIONER

## 2019-04-15 RX ORDER — PANTOPRAZOLE SODIUM 40 MG/1
40 TABLET, DELAYED RELEASE ORAL
Qty: 30 | Refills: 5 | Status: ACTIVE
Start: 2019-04-15

## 2019-04-15 RX ORDER — SUCRALFATE 1 G/1
4 TABLET ORAL
Qty: 120 | Refills: 1 | Status: ACTIVE
Start: 2019-04-15

## 2019-05-13 ENCOUNTER — HOSPITAL ENCOUNTER (OUTPATIENT)
Dept: GENERAL RADIOLOGY | Facility: HOSPITAL | Age: 39
Discharge: HOME | End: 2019-05-13

## 2019-05-21 ENCOUNTER — OFFICE (OUTPATIENT)
Dept: URBAN - METROPOLITAN AREA CLINIC 66 | Facility: CLINIC | Age: 39
End: 2019-05-21

## 2019-05-21 VITALS
HEART RATE: 67 BPM | SYSTOLIC BLOOD PRESSURE: 116 MMHG | HEIGHT: 73 IN | WEIGHT: 174 LBS | DIASTOLIC BLOOD PRESSURE: 71 MMHG

## 2019-05-21 DIAGNOSIS — K30 FUNCTIONAL DYSPEPSIA: ICD-10-CM

## 2019-05-21 DIAGNOSIS — N28.82 MEGALOURETER: ICD-10-CM

## 2019-05-21 DIAGNOSIS — R53.83 OTHER FATIGUE: ICD-10-CM

## 2019-05-21 DIAGNOSIS — N13.30 UNSPECIFIED HYDRONEPHROSIS: ICD-10-CM

## 2019-05-21 DIAGNOSIS — K29.70 GASTRITIS, UNSPECIFIED, WITHOUT BLEEDING: ICD-10-CM

## 2019-05-21 DIAGNOSIS — K44.9 DIAPHRAGMATIC HERNIA WITHOUT OBSTRUCTION OR GANGRENE: ICD-10-CM

## 2019-05-21 PROCEDURE — 99213 OFFICE O/P EST LOW 20 MIN: CPT | Performed by: NURSE PRACTITIONER

## 2019-05-21 RX ORDER — PANTOPRAZOLE SODIUM 40 MG/1
40 TABLET, DELAYED RELEASE ORAL
Qty: 30 | Refills: 5 | Status: ACTIVE
Start: 2019-04-15

## 2019-05-21 RX ORDER — SUCRALFATE 1 G/1
4 TABLET ORAL
Qty: 120 | Refills: 1 | Status: ACTIVE
Start: 2019-04-15

## 2019-06-13 ENCOUNTER — HOSPITAL ENCOUNTER (OUTPATIENT)
Dept: GENERAL RADIOLOGY | Facility: HOSPITAL | Age: 39
Discharge: HOME | End: 2019-06-13
Attending: UROLOGY

## 2019-06-13 ENCOUNTER — HOSPITAL ENCOUNTER (OUTPATIENT)
Dept: LAB | Facility: HOSPITAL | Age: 39
Discharge: HOME | End: 2019-06-13
Attending: UROLOGY

## 2019-06-13 LAB
ANION GAP SERPL CALC-SCNC: 18 MMOL/L (ref 8–19)
BUN SERPL-MCNC: 16 MG/DL (ref 5–25)
BUN/CREAT SERPL: 19 {RATIO} (ref 6–20)
CALCIUM SERPL-MCNC: 8.9 MG/DL (ref 8.7–10.4)
CHLORIDE SERPL-SCNC: 99 MMOL/L (ref 99–111)
CONV CO2: 25 MMOL/L (ref 22–32)
CREAT UR-MCNC: 0.84 MG/DL (ref 0.7–1.2)
GFR SERPLBLD BASED ON 1.73 SQ M-ARVRAT: >60 ML/MIN/{1.73_M2}
GLUCOSE SERPL-MCNC: 69 MG/DL (ref 70–99)
OSMOLALITY SERPL CALC.SUM OF ELEC: 284 MOSM/KG (ref 273–304)
POTASSIUM SERPL-SCNC: 4.5 MMOL/L (ref 3.5–5.3)
SODIUM SERPL-SCNC: 137 MMOL/L (ref 135–147)

## 2019-06-26 ENCOUNTER — HOSPITAL ENCOUNTER (OUTPATIENT)
Dept: GENERAL RADIOLOGY | Facility: HOSPITAL | Age: 39
Discharge: HOME | End: 2019-06-26
Attending: UROLOGY

## 2019-07-18 ENCOUNTER — OFFICE (OUTPATIENT)
Dept: URBAN - METROPOLITAN AREA CLINIC 66 | Facility: CLINIC | Age: 39
End: 2019-07-18

## 2019-07-18 VITALS
WEIGHT: 173 LBS | HEIGHT: 73 IN | HEART RATE: 66 BPM | DIASTOLIC BLOOD PRESSURE: 70 MMHG | SYSTOLIC BLOOD PRESSURE: 104 MMHG

## 2019-07-18 DIAGNOSIS — K30 FUNCTIONAL DYSPEPSIA: ICD-10-CM

## 2019-07-18 DIAGNOSIS — K29.70 GASTRITIS, UNSPECIFIED, WITHOUT BLEEDING: ICD-10-CM

## 2019-07-18 PROCEDURE — 99213 OFFICE O/P EST LOW 20 MIN: CPT | Performed by: INTERNAL MEDICINE

## 2019-09-17 ENCOUNTER — HOSPITAL ENCOUNTER (EMERGENCY)
Facility: HOSPITAL | Age: 39
Discharge: HOME | End: 2019-09-17
Attending: EMERGENCY MEDICINE | Admitting: EMERGENCY MEDICINE

## 2019-09-17 ENCOUNTER — APPOINTMENT (OUTPATIENT)
Dept: GENERAL RADIOLOGY | Facility: HOSPITAL | Age: 39
End: 2019-09-17

## 2019-09-17 ENCOUNTER — APPOINTMENT (OUTPATIENT)
Dept: CT IMAGING | Facility: HOSPITAL | Age: 39
End: 2019-09-17

## 2019-09-17 VITALS
DIASTOLIC BLOOD PRESSURE: 81 MMHG | OXYGEN SATURATION: 98 % | HEART RATE: 77 BPM | HEIGHT: 73 IN | SYSTOLIC BLOOD PRESSURE: 123 MMHG | TEMPERATURE: 97.5 F | RESPIRATION RATE: 16 BRPM | BODY MASS INDEX: 23.48 KG/M2

## 2019-09-17 DIAGNOSIS — R07.89 ATYPICAL CHEST PAIN: Primary | ICD-10-CM

## 2019-09-17 DIAGNOSIS — R51.9 ACUTE NONINTRACTABLE HEADACHE, UNSPECIFIED HEADACHE TYPE: ICD-10-CM

## 2019-09-17 LAB
ALBUMIN SERPL-MCNC: 5 G/DL (ref 3.5–5.2)
ALBUMIN/GLOB SERPL: 2.2 G/DL
ALP SERPL-CCNC: 72 U/L (ref 39–117)
ALT SERPL W P-5'-P-CCNC: 31 U/L (ref 1–41)
ANION GAP SERPL CALCULATED.3IONS-SCNC: 10.9 MMOL/L (ref 5–15)
AST SERPL-CCNC: 24 U/L (ref 1–40)
BASOPHILS # BLD AUTO: 0.02 10*3/MM3 (ref 0–0.2)
BASOPHILS NFR BLD AUTO: 0.4 % (ref 0–1.5)
BILIRUB SERPL-MCNC: 0.5 MG/DL (ref 0.2–1.2)
BUN BLD-MCNC: 12 MG/DL (ref 6–20)
BUN/CREAT SERPL: 15 (ref 7–25)
CALCIUM SPEC-SCNC: 9.1 MG/DL (ref 8.6–10.5)
CHLORIDE SERPL-SCNC: 103 MMOL/L (ref 98–107)
CO2 SERPL-SCNC: 28.1 MMOL/L (ref 22–29)
CREAT BLD-MCNC: 0.8 MG/DL (ref 0.76–1.27)
DEPRECATED RDW RBC AUTO: 38.3 FL (ref 37–54)
EOSINOPHIL # BLD AUTO: 0.06 10*3/MM3 (ref 0–0.4)
EOSINOPHIL NFR BLD AUTO: 1.1 % (ref 0.3–6.2)
ERYTHROCYTE [DISTWIDTH] IN BLOOD BY AUTOMATED COUNT: 12.1 % (ref 12.3–15.4)
GFR SERPL CREATININE-BSD FRML MDRD: 109 ML/MIN/1.73
GLOBULIN UR ELPH-MCNC: 2.3 GM/DL
GLUCOSE BLD-MCNC: 101 MG/DL (ref 65–99)
HCT VFR BLD AUTO: 47.3 % (ref 37.5–51)
HGB BLD-MCNC: 16.1 G/DL (ref 13–17.7)
IMM GRANULOCYTES # BLD AUTO: 0.01 10*3/MM3 (ref 0–0.05)
IMM GRANULOCYTES NFR BLD AUTO: 0.2 % (ref 0–0.5)
LIPASE SERPL-CCNC: 37 U/L (ref 13–60)
LYMPHOCYTES # BLD AUTO: 1.09 10*3/MM3 (ref 0.7–3.1)
LYMPHOCYTES NFR BLD AUTO: 19.6 % (ref 19.6–45.3)
MAGNESIUM SERPL-MCNC: 2.4 MG/DL (ref 1.6–2.6)
MCH RBC QN AUTO: 29.7 PG (ref 26.6–33)
MCHC RBC AUTO-ENTMCNC: 34 G/DL (ref 31.5–35.7)
MCV RBC AUTO: 87.1 FL (ref 79–97)
MONOCYTES # BLD AUTO: 0.36 10*3/MM3 (ref 0.1–0.9)
MONOCYTES NFR BLD AUTO: 6.5 % (ref 5–12)
NEUTROPHILS # BLD AUTO: 4.03 10*3/MM3 (ref 1.7–7)
NEUTROPHILS NFR BLD AUTO: 72.2 % (ref 42.7–76)
NRBC BLD AUTO-RTO: 0 /100 WBC (ref 0–0.2)
PLATELET # BLD AUTO: 178 10*3/MM3 (ref 140–450)
PMV BLD AUTO: 11 FL (ref 6–12)
POTASSIUM BLD-SCNC: 4.5 MMOL/L (ref 3.5–5.2)
PROT SERPL-MCNC: 7.3 G/DL (ref 6–8.5)
RBC # BLD AUTO: 5.43 10*6/MM3 (ref 4.14–5.8)
SODIUM BLD-SCNC: 142 MMOL/L (ref 136–145)
TROPONIN T SERPL-MCNC: <0.01 NG/ML (ref 0–0.03)
WBC NRBC COR # BLD: 5.57 10*3/MM3 (ref 3.4–10.8)

## 2019-09-17 PROCEDURE — 93010 ELECTROCARDIOGRAM REPORT: CPT | Performed by: INTERNAL MEDICINE

## 2019-09-17 PROCEDURE — 83690 ASSAY OF LIPASE: CPT | Performed by: EMERGENCY MEDICINE

## 2019-09-17 PROCEDURE — 99283 EMERGENCY DEPT VISIT LOW MDM: CPT

## 2019-09-17 PROCEDURE — 70450 CT HEAD/BRAIN W/O DYE: CPT

## 2019-09-17 PROCEDURE — 83735 ASSAY OF MAGNESIUM: CPT | Performed by: EMERGENCY MEDICINE

## 2019-09-17 PROCEDURE — 93005 ELECTROCARDIOGRAM TRACING: CPT

## 2019-09-17 PROCEDURE — 93005 ELECTROCARDIOGRAM TRACING: CPT | Performed by: EMERGENCY MEDICINE

## 2019-09-17 PROCEDURE — 80053 COMPREHEN METABOLIC PANEL: CPT | Performed by: EMERGENCY MEDICINE

## 2019-09-17 PROCEDURE — 84484 ASSAY OF TROPONIN QUANT: CPT | Performed by: EMERGENCY MEDICINE

## 2019-09-17 PROCEDURE — 71046 X-RAY EXAM CHEST 2 VIEWS: CPT

## 2019-09-17 PROCEDURE — 85025 COMPLETE CBC W/AUTO DIFF WBC: CPT | Performed by: EMERGENCY MEDICINE

## 2019-09-17 RX ORDER — SODIUM CHLORIDE 0.9 % (FLUSH) 0.9 %
10 SYRINGE (ML) INJECTION AS NEEDED
Status: DISCONTINUED | OUTPATIENT
Start: 2019-09-17 | End: 2019-09-17 | Stop reason: HOSPADM

## 2019-09-17 NOTE — ED PROVIDER NOTES
EMERGENCY DEPARTMENT ENCOUNTER    CHIEF COMPLAINT  Chief Complaint: Chest pain  History given by: Pt  History limited by: none  Room Number: 20/20  PMD: César Figueroa MD      HPI:  Pt is a 39 y.o. male who presents complaining of intermittent chest pain for the past 2-3 days. He states he has also had left arm pain. He denies any changes with exertion. He states he fell around a week ago down some steps and landed on his back. He denies head injury or LOC. He saw his PCP for the fall and was concerned he also hyperextended his left shoulder. He also states he has been having intermittent light-headedness, nausea, and a HA. He also states he has had frequent belching. He denies double vision, focal weakness, speech changes, or difficulty swallowing. He states he has been seen by Cardiology and GI in the past.     Duration:  2-3 days  Onset: gradual  Timing: intermittent  Location: left chest  Radiation: none  Quality: pain  Intensity/Severity: moderate  Progression: unchanged  Associated Symptoms: left arm pain, HA, light-headedness, nausea  Aggravating Factors: none  Alleviating Factors: none  Previous Episodes: none  Treatment before arrival: none    PAST MEDICAL HISTORY  Active Ambulatory Problems     Diagnosis Date Noted   • No Active Ambulatory Problems     Resolved Ambulatory Problems     Diagnosis Date Noted   • No Resolved Ambulatory Problems     Past Medical History:   Diagnosis Date   • Acute gastritis without hemorrhage    • Atypical chest pain        PAST SURGICAL HISTORY  Past Surgical History:   Procedure Laterality Date   • CHOLECYSTECTOMY         FAMILY HISTORY  Family History   Problem Relation Age of Onset   • Sudden death Paternal Aunt    • Cancer Maternal Grandfather    • Heart disease Paternal Grandfather 84       SOCIAL HISTORY  Social History     Socioeconomic History   • Marital status:      Spouse name: Not on file   • Number of children: Not on file   • Years of education: Not  on file   • Highest education level: Not on file   Tobacco Use   • Smoking status: Never Smoker   • Smokeless tobacco: Never Used   • Tobacco comment: caff use   Substance and Sexual Activity   • Alcohol use: Yes     Comment: social   • Drug use: No       ALLERGIES  Patient has no known allergies.    REVIEW OF SYSTEMS  Review of Systems   Constitutional: Negative for activity change, appetite change and fever.   HENT: Negative for congestion and sore throat.    Eyes: Negative.    Respiratory: Negative for cough and shortness of breath.    Cardiovascular: Positive for chest pain. Negative for leg swelling.   Gastrointestinal: Positive for nausea. Negative for abdominal pain, diarrhea and vomiting.   Endocrine: Negative.    Genitourinary: Negative for decreased urine volume and dysuria.   Musculoskeletal: Positive for myalgias (left arm). Negative for neck pain.   Skin: Negative for rash and wound.   Allergic/Immunologic: Negative.    Neurological: Positive for light-headedness and headaches. Negative for weakness and numbness.   Hematological: Negative.    Psychiatric/Behavioral: Negative.    All other systems reviewed and are negative.      PHYSICAL EXAM  ED Triage Vitals [09/17/19 1240]   Temp Heart Rate Resp BP SpO2   97.5 °F (36.4 °C) 80 16 -- 98 %      Temp src Heart Rate Source Patient Position BP Location FiO2 (%)   Tympanic Monitor -- -- --       Physical Exam   Constitutional: He is oriented to person, place, and time. No distress.   HENT:   Head: Normocephalic and atraumatic.   Eyes: EOM are normal. Pupils are equal, round, and reactive to light.   Neck: Normal range of motion. Neck supple.   Cardiovascular: Normal rate, regular rhythm, normal heart sounds and intact distal pulses.   Pulmonary/Chest: Effort normal and breath sounds normal. No respiratory distress. He has no wheezes. He has no rales.   Abdominal: Soft. Bowel sounds are normal. He exhibits no distension. There is no tenderness. There is no  rebound and no guarding.   Musculoskeletal: Normal range of motion. He exhibits no edema.   Neurological: He is alert and oriented to person, place, and time. He has normal sensation and normal strength. No cranial nerve deficit. Gait normal. Coordination normal.   Skin: Skin is warm and dry.   Psychiatric: Mood and affect normal.   Nursing note and vitals reviewed.      LAB RESULTS  Lab Results (last 24 hours)     Procedure Component Value Units Date/Time    CBC & Differential [[id number]] Collected:  09/17/19 1308    Specimen:  Blood Updated:  09/17/19 1328    Narrative:       The following orders were created for panel order CBC & Differential.  Procedure                               Abnormality         Status                     ---------                               -----------         ------                     CBC Auto Differential[[id number]]        Abnormal            Final result                 Please view results for these tests on the individual orders.    Comprehensive Metabolic Panel [[id number]]  (Abnormal) Collected:  09/17/19 1308    Specimen:  Blood Updated:  09/17/19 1352     Glucose 101 mg/dL      BUN 12 mg/dL      Creatinine 0.80 mg/dL      Sodium 142 mmol/L      Potassium 4.5 mmol/L      Chloride 103 mmol/L      CO2 28.1 mmol/L      Calcium 9.1 mg/dL      Total Protein 7.3 g/dL      Albumin 5.00 g/dL      ALT (SGPT) 31 U/L      AST (SGOT) 24 U/L      Alkaline Phosphatase 72 U/L      Total Bilirubin 0.5 mg/dL      eGFR Non African Amer 109 mL/min/1.73      Globulin 2.3 gm/dL      A/G Ratio 2.2 g/dL      BUN/Creatinine Ratio 15.0     Anion Gap 10.9 mmol/L     Narrative:       GFR Normal >60  Chronic Kidney Disease <60  Kidney Failure <15    Lipase [[id number]]  (Normal) Collected:  09/17/19 1308    Specimen:  Blood Updated:  09/17/19 1352     Lipase 37 U/L     Troponin [[id number]]  (Normal) Collected:  09/17/19 1308    Specimen:  Blood Updated:  09/17/19 1352     Troponin T <0.010 ng/mL      Narrative:       Troponin T Reference Range:  <= 0.03 ng/mL-   Negative for AMI  >0.03 ng/mL-     Abnormal for myocardial necrosis.  Clinicians would have to utilize clinical acumen, EKG, Troponin and serial changes to determine if it is an Acute Myocardial Infarction or myocardial injury due to an underlying chronic condition.     Magnesium [[id number]]  (Normal) Collected:  09/17/19 1308    Specimen:  Blood Updated:  09/17/19 1352     Magnesium 2.4 mg/dL     CBC Auto Differential [[id number]]  (Abnormal) Collected:  09/17/19 1308    Specimen:  Blood Updated:  09/17/19 1328     WBC 5.57 10*3/mm3      RBC 5.43 10*6/mm3      Hemoglobin 16.1 g/dL      Hematocrit 47.3 %      MCV 87.1 fL      MCH 29.7 pg      MCHC 34.0 g/dL      RDW 12.1 %      RDW-SD 38.3 fl      MPV 11.0 fL      Platelets 178 10*3/mm3      Neutrophil % 72.2 %      Lymphocyte % 19.6 %      Monocyte % 6.5 %      Eosinophil % 1.1 %      Basophil % 0.4 %      Immature Grans % 0.2 %      Neutrophils, Absolute 4.03 10*3/mm3      Lymphocytes, Absolute 1.09 10*3/mm3      Monocytes, Absolute 0.36 10*3/mm3      Eosinophils, Absolute 0.06 10*3/mm3      Basophils, Absolute 0.02 10*3/mm3      Immature Grans, Absolute 0.01 10*3/mm3      nRBC 0.0 /100 WBC           I ordered the above labs and reviewed the results    RADIOLOGY  CT Head Without Contrast   Final Result      XR Chest 2 View   Final Result      negative acute     I ordered the above noted radiological studies. Interpreted by radiologist. Discussed with radiologist (). Reviewed by me in PACS.       PROCEDURES  Procedures    EKG          EKG time: 1245  Rhythm/Rate: NSR, 65  P waves and TN: LAE  QRS, axis: LVH  ST and T waves: normal    Interpreted Contemporaneously by me, independently viewed  unchanged compared to prior 10/2/18      PROGRESS AND CONSULTS       1351  Per Dr. Greenwood, CT head is negative acute.     1450  Rechecked pt. Pt is resting comfortably in NAD. Notified pt of unremarkable test  results. Discussed the plan to discharge the pt home. I instructed the pt to f/u with his PCP. Pt understands and agrees with the plan, all questions answered.      MEDICAL DECISION MAKING  Results were reviewed/discussed with the patient and they were also made aware of online access. Pt also made aware that some labs, such as cultures, will not be resulted during ER visit and follow up with PMD is necessary.     MDM  Number of Diagnoses or Management Options     Amount and/or Complexity of Data Reviewed  Decide to obtain previous medical records or to obtain history from someone other than the patient: yes  Review and summarize past medical records: yes (seen in ER october 18th for CP and was referred to cardiology. The pt saw Dr. Louie in the office in october of 2018 and was referred to GI. I see no tests done.)           DIAGNOSIS  Final diagnoses:   Atypical chest pain   Acute nonintractable headache, unspecified headache type       DISPOSITION  DISCHARGE    Patient discharged in stable condition.    Reviewed implications of results, diagnosis, meds, responsibility to follow up, warning signs and symptoms of possible worsening, potential complications and reasons to return to ER.    Patient/Family voiced understanding of above instructions.    Discussed plan for discharge, as there is no emergent indication for admission. Patient referred to primary care provider for BP management due to today's BP. Pt/family is agreeable and understands need for follow up and repeat testing.  Pt is aware that discharge does not mean that nothing is wrong but it indicates no emergency is present that requires admission and they must continue care with follow-up as given below or physician of their choice.     FOLLOW-UP  César Figueroa MD  [facility] [address] [facility] [address] [phone]    Schedule an appointment as soon as possible for a visit   for continued evaluation of symptoms         Medication List      No changes  were made to your prescriptions during this visit.           Latest Documented Vital Signs:  As of 2:57 PM  BP- 123/81 HR- 77 Temp- 97.5 °F (36.4 °C) (Tympanic) O2 sat- 98%    --  Documentation assistance provided by andrade Kan for Dr. Ignacio.  Information recorded by the scribe was done at my direction and has been verified and validated by me.              Paulie Kan  09/17/19 [id number]       Nic Ignacio MD  09/17/19 [id number]

## 2020-09-03 ENCOUNTER — HOSPITAL ENCOUNTER (OUTPATIENT)
Dept: URGENT CARE | Facility: CLINIC | Age: 40
Discharge: HOME | End: 2020-09-03
Attending: PHYSICIAN ASSISTANT

## 2020-09-06 LAB — SARS-COV-2 RNA SPEC QL NAA+PROBE: NOT DETECTED

## 2021-05-15 VITALS
BODY MASS INDEX: 23.06 KG/M2 | TEMPERATURE: 98.2 F | DIASTOLIC BLOOD PRESSURE: 78 MMHG | OXYGEN SATURATION: 99 % | SYSTOLIC BLOOD PRESSURE: 108 MMHG | RESPIRATION RATE: 12 BRPM | HEIGHT: 73 IN | HEART RATE: 80 BPM | WEIGHT: 174 LBS

## 2021-05-16 VITALS
TEMPERATURE: 98.2 F | HEIGHT: 73 IN | HEART RATE: 69 BPM | OXYGEN SATURATION: 99 % | SYSTOLIC BLOOD PRESSURE: 113 MMHG | RESPIRATION RATE: 16 BRPM | WEIGHT: 176 LBS | BODY MASS INDEX: 23.33 KG/M2 | DIASTOLIC BLOOD PRESSURE: 64 MMHG

## 2021-05-16 VITALS
BODY MASS INDEX: 23.61 KG/M2 | HEIGHT: 73 IN | TEMPERATURE: 97.5 F | DIASTOLIC BLOOD PRESSURE: 69 MMHG | WEIGHT: 178.12 LBS | OXYGEN SATURATION: 100 % | RESPIRATION RATE: 16 BRPM | SYSTOLIC BLOOD PRESSURE: 109 MMHG | HEART RATE: 59 BPM

## 2021-05-16 VITALS
TEMPERATURE: 98.7 F | WEIGHT: 174.37 LBS | BODY MASS INDEX: 23.11 KG/M2 | DIASTOLIC BLOOD PRESSURE: 64 MMHG | RESPIRATION RATE: 16 BRPM | HEART RATE: 70 BPM | SYSTOLIC BLOOD PRESSURE: 122 MMHG | HEIGHT: 73 IN | OXYGEN SATURATION: 99 %

## 2021-05-16 VITALS
SYSTOLIC BLOOD PRESSURE: 113 MMHG | RESPIRATION RATE: 16 BRPM | WEIGHT: 180.06 LBS | HEART RATE: 82 BPM | BODY MASS INDEX: 23.86 KG/M2 | TEMPERATURE: 97.9 F | DIASTOLIC BLOOD PRESSURE: 62 MMHG | OXYGEN SATURATION: 99 % | HEIGHT: 73 IN

## 2021-05-16 VITALS
TEMPERATURE: 98.6 F | WEIGHT: 170 LBS | OXYGEN SATURATION: 98 % | RESPIRATION RATE: 12 BRPM | HEIGHT: 61 IN | SYSTOLIC BLOOD PRESSURE: 118 MMHG | BODY MASS INDEX: 32.1 KG/M2 | HEART RATE: 78 BPM | DIASTOLIC BLOOD PRESSURE: 76 MMHG

## 2021-07-26 PROCEDURE — 87081 CULTURE SCREEN ONLY: CPT | Performed by: NURSE PRACTITIONER

## 2021-07-30 ENCOUNTER — TELEPHONE (OUTPATIENT)
Dept: URGENT CARE | Facility: CLINIC | Age: 41
End: 2021-07-30

## 2021-07-30 NOTE — TELEPHONE ENCOUNTER
----- Message from JOSÉ ANTONIO Adams sent at 7/28/2021  9:59 AM EDT -----  Please call the patient regarding his negative result.

## 2022-05-06 ENCOUNTER — OFFICE VISIT (OUTPATIENT)
Dept: UROLOGY | Facility: CLINIC | Age: 42
End: 2022-05-06

## 2022-05-06 VITALS
HEIGHT: 73 IN | SYSTOLIC BLOOD PRESSURE: 117 MMHG | BODY MASS INDEX: 21.66 KG/M2 | WEIGHT: 163.4 LBS | DIASTOLIC BLOOD PRESSURE: 75 MMHG

## 2022-05-06 DIAGNOSIS — N43.3 HYDROCELE IN ADULT: Primary | ICD-10-CM

## 2022-05-06 LAB
BILIRUB BLD-MCNC: NEGATIVE MG/DL
CLARITY, POC: CLEAR
COLOR UR: YELLOW
EXPIRATION DATE: NORMAL
GLUCOSE UR STRIP-MCNC: NEGATIVE MG/DL
KETONES UR QL: NEGATIVE
LEUKOCYTE EST, POC: NEGATIVE
Lab: NORMAL
NITRITE UR-MCNC: NEGATIVE MG/ML
PH UR: 6 [PH] (ref 5–8)
PROT UR STRIP-MCNC: NEGATIVE MG/DL
RBC # UR STRIP: NEGATIVE /UL
SP GR UR: 1.02 (ref 1–1.03)
UROBILINOGEN UR QL: NORMAL

## 2022-05-06 PROCEDURE — 99202 OFFICE O/P NEW SF 15 MIN: CPT | Performed by: UROLOGY

## 2022-05-06 PROCEDURE — 81003 URINALYSIS AUTO W/O SCOPE: CPT | Performed by: UROLOGY

## 2022-05-06 RX ORDER — FAMOTIDINE 40 MG/1
40 TABLET, FILM COATED ORAL DAILY
COMMUNITY
Start: 2022-03-01 | End: 2022-09-02

## 2022-05-06 RX ORDER — DIPHENOXYLATE HYDROCHLORIDE AND ATROPINE SULFATE 2.5; .025 MG/1; MG/1
1 TABLET ORAL DAILY
COMMUNITY

## 2022-05-06 RX ORDER — CLINDAMYCIN HYDROCHLORIDE 150 MG/1
1 CAPSULE ORAL 3 TIMES DAILY
COMMUNITY
Start: 2022-05-02 | End: 2022-09-02

## 2022-05-06 NOTE — PROGRESS NOTES
"Chief Complaint  Establish Care (Hydrocele/varicocele)    Subjective          Enoch Goddard presents to [facility] referred for a scrotal mass.     History of Present Illness     Mr. Goddard is referred to for scrotal mass. He had an ultrasound of his scrotum which revealed a small left hydrocele, a small right varicocele, and a 7-mm slightly complex structure within the scrotal soft tissues, which was likely a subcutaneous lymph node or small abscess.    The patient reports that last Thursday, 04/28/2022, he got in bed and felt a swollen lump. He states that Friday, 04/29/2022, it was hurting and there was a lump in the back of his scrotum. The patient reports that he went to see Dr. Hopkins and he was told that he needed to get an ultrasound, which he got that day. He reports that by Saturday night, 04/30/2022, it was very sore. The patient reports that Monday, 05/02/2022, he called Dr. Hopkins, and by that time it had gone from a lump under the skin to about that hard under the skin. He reports that it started boiling. The patient stated it was lanced and he was started on antibiotics. He reports his pain has went down, but there is still some discomfort. [place]     A review of systems was completed and positive findings are noted in the HPI.      Objective   Vital Signs:  /75   Ht 185.4 cm (73\")   Wt 74.1 kg (163 lb 6.4 oz)   BMI 21.56 kg/m²        Physical Exam  Vitals and nursing note reviewed.   Constitutional:       Appearance: Normal appearance. He is well-developed.   Pulmonary:      Effort: Pulmonary effort is normal.      Breath sounds: Normal air entry.   Neurological:      Mental Status: He is alert and oriented to person, place, and time.      Motor: Motor function is intact.   Psychiatric:         Mood and Affect: Mood normal.         Behavior: Behavior normal.          Result Review :   The following data was reviewed by: Ani MCCULLOUGH" MD Jeninfer on 05/06/2022:    Data reviewed: Radiologic studies Scrotal ultrasound from [facility]          Assessment and Plan    Diagnoses and all orders for this visit:    1. Hydrocele in adult (Primary)  -     POC Urinalysis Dipstick, Automated     Assessment & Plan        -     We will see him back as needed.        -     Appears well healed, just a little bit of inflammation underneath it.        -     We did discuss hydrocele and varicocele, but no need for treatment  at this time.        - He will follow up as needed.         Follow Up   No follow-ups on file.  Patient was given instructions and counseling regarding his condition or for health maintenance advice. Please see specific information pulled into the AVS if appropriate.     Transcribed from ambient dictation for Ani Rodriguez MD by Mera Echeverria.  05/06/22   14:44 EDT    Patient verbalized consent to the visit recording.

## 2022-05-27 ENCOUNTER — TELEPHONE (OUTPATIENT)
Dept: UROLOGY | Facility: CLINIC | Age: 42
End: 2022-05-27

## 2022-05-27 NOTE — TELEPHONE ENCOUNTER
I cannot see him until next Wed.  He probably needs to go to the ER if his PCP cannot see him today.  He does not need to wait until next week to be seen.

## 2022-05-27 NOTE — TELEPHONE ENCOUNTER
Patient said he has a place that probably looked like ingrown hair at his appointment on 05/06/22 with Dr. Rodriguez.  It is at the top of base of penis.  It has gotten worse.  It has oozed some clear drainage.  It is painful.  He is having a hard time wearing pants.  He said he just finished a long round of antibiotics.  He is asking what Dr. Rodriguez thinks about it, and asking to be seen.      He said he had another place that had been lanced by PCP, prior to seeing Dr Rodriguez 05/06/22.  There is still a little lump under the skin.

## 2022-07-29 ENCOUNTER — TRANSCRIBE ORDERS (OUTPATIENT)
Dept: ADMINISTRATIVE | Facility: HOSPITAL | Age: 42
End: 2022-07-29

## 2022-07-29 DIAGNOSIS — R00.2 PALPITATIONS: Primary | ICD-10-CM

## 2022-08-18 ENCOUNTER — HOSPITAL ENCOUNTER (OUTPATIENT)
Dept: CARDIOLOGY | Facility: HOSPITAL | Age: 42
Discharge: HOME | End: 2022-08-18
Admitting: FAMILY MEDICINE

## 2022-08-18 DIAGNOSIS — R00.2 PALPITATIONS: ICD-10-CM

## 2022-08-18 PROCEDURE — 93225 XTRNL ECG REC<48 HRS REC: CPT

## 2022-08-25 LAB
MAXIMAL PREDICTED HEART RATE: 180 BPM
STRESS TARGET HR: 153 BPM

## 2022-08-25 PROCEDURE — 93227 XTRNL ECG REC<48 HR R&I: CPT | Performed by: INTERNAL MEDICINE

## 2022-09-02 PROCEDURE — U0004 COV-19 TEST NON-CDC HGH THRU: HCPCS | Performed by: FAMILY MEDICINE

## 2022-11-29 ENCOUNTER — OFFICE VISIT (OUTPATIENT)
Dept: NEUROSURGERY | Facility: CLINIC | Age: 42
End: 2022-11-29

## 2022-11-29 VITALS
HEIGHT: 73 IN | SYSTOLIC BLOOD PRESSURE: 128 MMHG | BODY MASS INDEX: 21.87 KG/M2 | WEIGHT: 165 LBS | DIASTOLIC BLOOD PRESSURE: 78 MMHG

## 2022-11-29 DIAGNOSIS — M51.16 LUMBAR DISC HERNIATION WITH RADICULOPATHY: Primary | ICD-10-CM

## 2022-11-29 PROCEDURE — 99204 OFFICE O/P NEW MOD 45 MIN: CPT | Performed by: NEUROLOGICAL SURGERY

## 2022-11-29 NOTE — PROGRESS NOTES
Subjective   History of Present Illness: Enoch Goddard is a 42 y.o. male is being seen for consultation today at the request of César Figueroa MD for right hip and leg pain that began around July 18th, 2022 without any known injury. He reports some weakness in his right leg. He does states he has intermittent tingling in his right foot and sometimes his anterior right thigh. He is unable to sit or stand for long periods of time due to severe cramping pain down the back of the leg settling in the calf. He has been to the chiropractor for 5 visits without improvement. He has started physical therapy and has only been to two visits so far although he got some partial improvement with traction that lasted a day or 2 but he was not pain-free.     While in the room and during my examination of the patient I wore a mask and eye protection.  I washed my hands before and after this patient encounter.  The patient was also wearing a mask.    Back Pain  The problem occurs constantly. The problem has been gradually worsening since onset. The pain is present in the lumbar spine. The quality of the pain is described as aching, burning, cramping, shooting and stabbing. The pain radiates to the right thigh, right foot and right knee. The symptoms are aggravated by position. Stiffness is present in the morning. Associated symptoms include numbness, tingling and weakness. Pertinent negatives include no chest pain. He has tried chiropractic manipulation (physical therapy) for the symptoms.       Tobacco Use: Low Risk    • Smoking Tobacco Use: Never   • Smokeless Tobacco Use: Never   • Passive Exposure: Not on file        The following portions of the patient's history were reviewed and updated as appropriate: allergies, current medications, past family history, past medical history, past social history, past surgical history and problem list.    Review of Systems   Respiratory: Negative for chest tightness and shortness of  "breath.    Cardiovascular: Negative for chest pain.   Musculoskeletal: Positive for back pain and gait problem.   Neurological: Positive for tingling, weakness and numbness.       Objective     Vitals:    11/29/22 1031   BP: 128/78   Weight: 74.8 kg (165 lb)   Height: 185.4 cm (73\")     Body mass index is 21.77 kg/m².      Physical Exam  Neurologic Exam    Physical Exam:    CONSTITUTIONAL: This 42 year old  male appears well developed, well-nourished and has to stand because he can barely sit down without having severe pain mostly in the right leg    HEAD & FACE: the head and face are symmetric, normocephalic and atraumatic.    EYES: Inspection of the conjunctivae and lids reveals no swelling, erythema or discharge.  Pupils are round, equal and reactive to light and there is no scleral icterus.    EARS, NOSE, MOUTH & THROAT: On inspection, the ears and nose are within normal limits.    NECK: the neck is supple and symmetric. The trachea is midline with no masses.    PULMONARY: Respiratory effort is normal with no increased work of breathing or signs of respiratory distress.    CARDIOVASCULAR: Pedal pulses are +1/4 bilaterally. Examination of the extremities shows no edema or varicosities.    MUSCULOSKELETAL: Gait and station reveal the patient is standing with a wide based stance slightly bent forward unable to sit comfortably. The spine has tenderness in the midline.  When the patient tries to sit on the exam table he basically has to lean back standing until his buttocks hits the exam table and then slide back still trying to keep his hips from flexing because of the pain that develops in his right leg.    SKIN: The skin is warm, dry and intact    NEUROLOGIC:   Cranial Nerves 2-12 intact  Normal motor strength noted confrontational testing. Muscle bulk and tone are normal.  Sensory exam is normal to fine touch to confrontational testing bilaterally  Reflexes on the right side demonstrates 2/4 Triceps " Reflex, 2/4 Biceps Reflex, 2/4 Brachioradialis Reflex, 2/4 Knee Jerk Reflex, 0/4 Ankle Jerk Reflex and no ankle clonus on the right.   Reflexes on the left side demonstrates 2/4 Triceps Reflex, 2/4 Biceps Reflex, 2/4 Brachioradialis Reflex, 2/4 Knee Jerk Reflex, 2/4 Ankle Jerk Reflex and no ankle clonus on the left.  Superficial/Primitive Reflexes: primitive reflexes were absent.  Hernández's, Babinski, and Clonus signs all negative.  No coordination deficit observed.  Radicular testing showed a negative London (JENN) test and markedly positive straight leg raise on the right and crossed positive on the left.  Cortical function is intact and without deficits. Speech is normal.    PSYCHIATRIC: oriented to person, place and time. Patient's mood and affect are normal.    Assessment & Plan   Independent Review of Radiographic Studies:      I personally reviewed the images from the following studies.    MRI of the lumbar spine done at [facility] on November 3, 2022 reveals a large right disc herniation at L5-S1 there is an isolated finding.    Medical Decision Making:      Patient has severe S1 radiculopathy in the right lower extremity that is been progressing since July 2023, 4 months ago.  He has tried conservative efforts with yoga exercises, chiropractic care, and most recently physical therapy with traction with only transient improvement.  His clinical exam documents the S1 radiculopathy with the loss of ankle reflex on the right and the S1 nerve root tingling subjectively.  Therefore I have suggested that since she has been hurting well over 6 weeks and not responded to conservative measures he is a candidate to consider a right L5-S1 lumbar microscopic discectomy.    A lumbar microscopic discectomy involves a small skin incision localized over the affected disc which is confirmed by X-ray. The natural space between lamina bones is widened with a drill and the nerve is gently retracted to expose the disc  abnormality. The fragments of disc or bone spurs are removed to make more room around the nerve then the skin is closed with sutures    The patient understands that there are inherent risks to surgery including bleeding, infection, anesthetic risk, death, heart attack, stroke, damage to nerves, weakness, numbness, paralysis, failure to improve, need for further surgery, CSF leak, recurrence, persistent pain, and any other unforeseen complications.  He and his father comprehend and had opportunity to ask further questions.    He is leaning towards considering surgery but wanted to try a few more visits of physical therapy before he completely consented and is going to continue therapy this week and the first of next week.  He wanted to try to schedule the surgery 2 weeks from now if he does not respond to the physical therapy in that period of time.  I told him I can justify surgery at this point giving the timing, neurologic deficit and the conservative treatment that he is already had but he wanted to try a few more visits of physical therapy.    Return for follow-up after surgery.    Diagnoses and all orders for this visit:    1. Lumbar disc herniation with radiculopathy (Primary)  -     Ambulatory Referral to Physical Therapy Evaluate and treat  -     Case Request; Standing  -     ceFAZolin (ANCEF) 2 g in sodium chloride 0.9 % 100 mL IVPB  -     Case Request    Other orders  -     Follow Anesthesia Guidelines / Protocol; Future  -     Obtain informed consent  -     Provide NPO Instructions to Patient; Future  -     Follow Anesthesia Guidelines / Protocol; Standing  -     Verify NPO Status; Standing  -     Obtain Informed Consent; Standing  -     SCD (sequential compression device)- to be placed on patient in Pre-op; Standing  -     Instructions on Coughing, Deep Breathing & Incentive Spirometry; Standing             Yogesh Moran MD FACS FAANS  Neurological Surgery

## 2022-12-02 ENCOUNTER — PATIENT ROUNDING (BHMG ONLY) (OUTPATIENT)
Dept: NEUROSURGERY | Facility: CLINIC | Age: 42
End: 2022-12-02

## 2022-12-05 ENCOUNTER — TELEPHONE (OUTPATIENT)
Dept: NEUROSURGERY | Facility: CLINIC | Age: 42
End: 2022-12-05

## 2022-12-05 NOTE — TELEPHONE ENCOUNTER
I spoke with Stefan Nitza, he was calling with concerns of his postoperative instructions. He reports he was unsure about the bending sitting and twisting instructions with having to get in and out of bed and or use the bathroom. I explained that he is able to get in and out of bed and use the bathroom but just being cautious and not doing them for prolong periods of time.

## 2022-12-09 ENCOUNTER — TELEPHONE (OUTPATIENT)
Dept: NEUROSURGERY | Facility: CLINIC | Age: 42
End: 2022-12-09

## 2022-12-09 ENCOUNTER — APPOINTMENT (OUTPATIENT)
Dept: PREADMISSION TESTING | Facility: HOSPITAL | Age: 42
End: 2022-12-09

## 2022-12-12 ENCOUNTER — TELEPHONE (OUTPATIENT)
Dept: NEUROSURGERY | Facility: CLINIC | Age: 42
End: 2022-12-12

## 2022-12-12 NOTE — TELEPHONE ENCOUNTER
Lebron from [employer] wants to know if we are aware that pt's surgery needs a peer 2 peer?  [phone] ext [phone]

## 2022-12-12 NOTE — TELEPHONE ENCOUNTER
Caller: AMANDEEP    Relationship to patient: [employer]    Best call back number: [phone]  Patient is needing: [employer] IS CALLING TO GET MISSING CLINICAL DOCUMENTATION TO AUTH SURGERY FOR [facility].    SHE STATES IT NEED TO BE SENT W/IN THE HOUR IN ORDER TO APPROVE AND COMPLETE PEER TO PEER    PLEASE CALL TO ADVISE     ATTEMPTED WT

## 2022-12-13 ENCOUNTER — TELEPHONE (OUTPATIENT)
Dept: NEUROSURGERY | Facility: CLINIC | Age: 42
End: 2022-12-13

## 2022-12-13 ENCOUNTER — PRE-ADMISSION TESTING (OUTPATIENT)
Dept: PREADMISSION TESTING | Facility: HOSPITAL | Age: 42
End: 2022-12-13

## 2022-12-13 VITALS
HEIGHT: 72 IN | BODY MASS INDEX: 22.21 KG/M2 | TEMPERATURE: 97 F | RESPIRATION RATE: 20 BRPM | OXYGEN SATURATION: 100 % | SYSTOLIC BLOOD PRESSURE: 129 MMHG | HEART RATE: 70 BPM | DIASTOLIC BLOOD PRESSURE: 77 MMHG | WEIGHT: 164 LBS

## 2022-12-13 LAB
ANION GAP SERPL CALCULATED.3IONS-SCNC: 11.6 MMOL/L (ref 5–15)
BUN SERPL-MCNC: 13 MG/DL (ref 6–20)
BUN/CREAT SERPL: 15.1 (ref 7–25)
CALCIUM SPEC-SCNC: 9.7 MG/DL (ref 8.6–10.5)
CHLORIDE SERPL-SCNC: 102 MMOL/L (ref 98–107)
CO2 SERPL-SCNC: 28.4 MMOL/L (ref 22–29)
CREAT SERPL-MCNC: 0.86 MG/DL (ref 0.76–1.27)
DEPRECATED RDW RBC AUTO: 41 FL (ref 37–54)
EGFRCR SERPLBLD CKD-EPI 2021: 112.3 ML/MIN/1.73
ERYTHROCYTE [DISTWIDTH] IN BLOOD BY AUTOMATED COUNT: 12.7 % (ref 12.3–15.4)
GLUCOSE SERPL-MCNC: 73 MG/DL (ref 65–99)
HCT VFR BLD AUTO: 46.2 % (ref 37.5–51)
HGB BLD-MCNC: 15.5 G/DL (ref 13–17.7)
MCH RBC QN AUTO: 29.4 PG (ref 26.6–33)
MCHC RBC AUTO-ENTMCNC: 33.5 G/DL (ref 31.5–35.7)
MCV RBC AUTO: 87.5 FL (ref 79–97)
PLATELET # BLD AUTO: 170 10*3/MM3 (ref 140–450)
PMV BLD AUTO: 11 FL (ref 6–12)
POTASSIUM SERPL-SCNC: 4.2 MMOL/L (ref 3.5–5.2)
QT INTERVAL: 395 MS
RBC # BLD AUTO: 5.28 10*6/MM3 (ref 4.14–5.8)
SODIUM SERPL-SCNC: 142 MMOL/L (ref 136–145)
WBC NRBC COR # BLD: 4.26 10*3/MM3 (ref 3.4–10.8)

## 2022-12-13 PROCEDURE — 93005 ELECTROCARDIOGRAM TRACING: CPT

## 2022-12-13 PROCEDURE — 85027 COMPLETE CBC AUTOMATED: CPT

## 2022-12-13 PROCEDURE — 80048 BASIC METABOLIC PNL TOTAL CA: CPT

## 2022-12-13 PROCEDURE — 36415 COLL VENOUS BLD VENIPUNCTURE: CPT

## 2022-12-13 PROCEDURE — 93010 ELECTROCARDIOGRAM REPORT: CPT | Performed by: INTERNAL MEDICINE

## 2022-12-13 RX ORDER — OMEPRAZOLE 40 MG/1
40 CAPSULE, DELAYED RELEASE ORAL NIGHTLY PRN
COMMUNITY

## 2022-12-13 RX ORDER — CYCLOBENZAPRINE HCL 10 MG
5 TABLET ORAL NIGHTLY
Status: ON HOLD | COMMUNITY
End: 2022-12-19 | Stop reason: SDUPTHER

## 2022-12-13 RX ORDER — VIT C/B6/B5/MAGNESIUM/HERB 173 50-5-6-5MG
1000 CAPSULE ORAL 2 TIMES DAILY
COMMUNITY

## 2022-12-13 NOTE — H&P
Subjective   History of Present Illness: Enoch Goddard is a 42 y.o. male is being seen today for surgical management to address the disc herniation causing right hip and leg pain that began around July 18th, 2022 without any known injury. He reports some weakness in his right leg. He does states he has intermittent tingling in his right foot and sometimes his anterior right thigh. He is unable to sit or stand for long periods of time due to severe cramping pain down the back of the leg settling in the calf. He has been to the chiropractor for 5 visits without improvement. He has started physical therapy and has only been to two visits so far although he got some partial improvement with traction that lasted a day or 2 but he was not pain-free.     This document is an updated document from my office visits formulated as a history and physical for the purposes of surgery.    While in the room and during my examination of the patient I wore a mask and eye protection.  I washed my hands before and after this patient encounter.  The patient was also wearing a mask.    Back Pain  The problem occurs constantly. The problem has been gradually worsening since onset. The pain is present in the lumbar spine. The quality of the pain is described as aching, burning, cramping, shooting and stabbing. The pain radiates to the right thigh, right foot and right knee. The symptoms are aggravated by position. Stiffness is present in the morning. Associated symptoms include numbness, tingling and weakness. Pertinent negatives include no chest pain. He has tried chiropractic manipulation (physical therapy) for the symptoms.       Tobacco Use: Low Risk    • Smoking Tobacco Use: Never   • Smokeless Tobacco Use: Never   • Passive Exposure: Not on file        The following portions of the patient's history were reviewed and updated as appropriate: allergies, current medications, past family history, past medical history, past social  "history, past surgical history and problem list.    Review of Systems   Respiratory: Negative for chest tightness and shortness of breath.    Cardiovascular: Negative for chest pain.   Musculoskeletal: Positive for back pain and gait problem.   Neurological: Positive for tingling, weakness and numbness.       Objective     Vitals:    11/29/22 1031   BP: 128/78   Weight: 74.8 kg (165 lb)   Height: 185.4 cm (73\")     Body mass index is 21.77 kg/m².      Physical Exam  Neurologic Exam    Physical Exam:    CONSTITUTIONAL: This 42 year old  male appears well developed, well-nourished and has to stand because he can barely sit down without having severe pain mostly in the right leg    HEAD & FACE: the head and face are symmetric, normocephalic and atraumatic.    EYES: Inspection of the conjunctivae and lids reveals no swelling, erythema or discharge.  Pupils are round, equal and reactive to light and there is no scleral icterus.    EARS, NOSE, MOUTH & THROAT: On inspection, the ears and nose are within normal limits.    NECK: the neck is supple and symmetric. The trachea is midline with no masses.    PULMONARY: Respiratory effort is normal with no increased work of breathing or signs of respiratory distress.    CARDIOVASCULAR: Pedal pulses are +1/4 bilaterally. Examination of the extremities shows no edema or varicosities.    MUSCULOSKELETAL: Gait and station reveal the patient is standing with a wide based stance slightly bent forward unable to sit comfortably. The spine has tenderness in the midline.  When the patient tries to sit on the exam table he basically has to lean back standing until his buttocks hits the exam table and then slide back still trying to keep his hips from flexing because of the pain that develops in his right leg.    SKIN: The skin is warm, dry and intact    NEUROLOGIC:   Cranial Nerves 2-12 intact  Normal motor strength noted confrontational testing. Muscle bulk and tone are " normal.  Sensory exam is normal to fine touch to confrontational testing bilaterally  Reflexes on the right side demonstrates 2/4 Triceps Reflex, 2/4 Biceps Reflex, 2/4 Brachioradialis Reflex, 2/4 Knee Jerk Reflex, 0/4 Ankle Jerk Reflex and no ankle clonus on the right.   Reflexes on the left side demonstrates 2/4 Triceps Reflex, 2/4 Biceps Reflex, 2/4 Brachioradialis Reflex, 2/4 Knee Jerk Reflex, 2/4 Ankle Jerk Reflex and no ankle clonus on the left.  Superficial/Primitive Reflexes: primitive reflexes were absent.  Hernández's, Babinski, and Clonus signs all negative.  No coordination deficit observed.  Radicular testing showed a negative London (JENN) test and markedly positive straight leg raise on the right and crossed positive on the left.  Cortical function is intact and without deficits. Speech is normal.    PSYCHIATRIC: oriented to person, place and time. Patient's mood and affect are normal.    Assessment & Plan   Independent Review of Radiographic Studies:      I personally reviewed the images from the following studies.    MRI of the lumbar spine done at [facility] on November 3, 2022 reveals a large right disc herniation at L5-S1 there is an isolated finding.    Medical Decision Making:      Patient has severe S1 radiculopathy in the right lower extremity that is been progressing since July 2023, 4 months ago.  He has tried conservative efforts with yoga exercises, chiropractic care, and most recently physical therapy with traction with only transient improvement.  His clinical exam documents the S1 radiculopathy with the loss of ankle reflex on the right and the S1 nerve root tingling subjectively.  Therefore I have suggested that since she has been hurting well over 6 weeks and not responded to conservative measures he is a candidate to consider a right L5-S1 lumbar microscopic discectomy.    A lumbar microscopic discectomy involves a small skin incision localized over the affected disc which is  confirmed by X-ray. The natural space between lamina bones is widened with a drill and the nerve is gently retracted to expose the disc abnormality. The fragments of disc or bone spurs are removed to make more room around the nerve then the skin is closed with sutures    The patient understands that there are inherent risks to surgery including bleeding, infection, anesthetic risk, death, heart attack, stroke, damage to nerves, weakness, numbness, paralysis, failure to improve, need for further surgery, CSF leak, recurrence, persistent pain, and any other unforeseen complications.  He and his father comprehend and had opportunity to ask further questions.    He was leaning towards considering surgery when I initially saw him but wanted to try a few more visits of physical therapy before he completely consented.  After continued attempt at physical therapy is elected to proceed with surgery given the persistent intractable symptoms into the right leg.    Return for follow-up after surgery.    Diagnoses and all orders for this visit:    1. Lumbar disc herniation with radiculopathy (Primary)  -     Ambulatory Referral to Physical Therapy Evaluate and treat  -     Case Request; Standing  -     ceFAZolin (ANCEF) 2 g in sodium chloride 0.9 % 100 mL IVPB  -     Case Request    Other orders  -     Follow Anesthesia Guidelines / Protocol; Future  -     Obtain informed consent  -     Provide NPO Instructions to Patient; Future  -     Follow Anesthesia Guidelines / Protocol; Standing  -     Verify NPO Status; Standing  -     Obtain Informed Consent; Standing  -     SCD (sequential compression device)- to be placed on patient in Pre-op; Standing  -     Instructions on Coughing, Deep Breathing & Incentive Spirometry; Standing             Yogesh Moran MD FACS FAANS  Neurological Surgery

## 2022-12-13 NOTE — DISCHARGE INSTRUCTIONS
Take the following medications the morning of surgery:    none    If you are on prescription narcotic pain medication to control your pain you may also take that medication the morning of surgery.    General Instructions:  Do not eat solid food after midnight the night before surgery.  You may drink clear liquids day of surgery but must stop at least one hour before your hospital arrival time.  It is beneficial for you to have a clear drink that contains carbohydrates the day of surgery.  We suggest a 12 to 20 ounce bottle of Gatorade or Powerade for non-diabetic patients or a 12 to 20 ounce bottle of G2 or Powerade Zero for diabetic patients. (Pediatric patients, are not advised to drink a 12 to 20 ounce carbohydrate drink)    Clear liquids are liquids you can see through.  Nothing red in color.     Plain water                               Sports drinks  Sodas                                   Gelatin (Jell-O)  Fruit juices without pulp such as white grape juice and apple juice  Popsicles that contain no fruit or yogurt  Tea or coffee (no cream or milk added)  Gatorade / Powerade  G2 / Powerade Zero    Infants may have breast milk up to four hours before surgery.  Infants drinking formula may drink formula up to six hours before surgery.   Patients who avoid smoking, chewing tobacco and alcohol for 4 weeks prior to surgery have a reduced risk of post-operative complications.  Quit smoking as many days before surgery as you can.  Do not smoke, use chewing tobacco or drink alcohol the day of surgery.   If applicable bring your C-PAP/ BI-PAP machine.  Bring any papers given to you in the doctor’s office.  Wear clean comfortable clothes.  Do not wear contact lenses, false eyelashes or make-up.  Bring a case for your glasses.   Bring crutches or walker if applicable.  Remove all piercings.  Leave jewelry and any other valuables at home.  Hair extensions with metal clips must be removed prior to surgery.  The  Pre-Admission Testing nurse will instruct you to bring medications if unable to obtain an accurate list in Pre-Admission Testing.        If you were given a blood bank ID arm band remember to bring it with you the day of surgery.    Preventing a Surgical Site Infection:  For 2 to 3 days before surgery, avoid shaving with a razor because the razor can irritate skin and make it easier to develop an infection.    Any areas of open skin can increase the risk of a post-operative wound infection by allowing bacteria to enter and travel throughout the body.  Notify your surgeon if you have any skin wounds / rashes even if it is not near the expected surgical site.  The area will need assessed to determine if surgery should be delayed until it is healed.  The night prior to surgery shower using a fresh bar of anti-bacterial soap (such as Dial) and clean washcloth.  Sleep in a clean bed with clean clothing.  Do not allow pets to sleep with you.  Shower on the morning of surgery using a fresh bar of anti-bacterial soap (such as Dial) and clean washcloth.  Dry with a clean towel and dress in clean clothing.  Ask your surgeon if you will be receiving antibiotics prior to surgery.  Make sure you, your family, and all healthcare providers clean their hands with soap and water or an alcohol based hand  before caring for you or your wound.    Day of surgery:12/14/2022   0900  Your arrival time is approximately two hours before your scheduled surgery time.  Upon arrival, a Pre-op nurse and Anesthesiologist will review your health history, obtain vital signs, and answer questions you may have.  The only belongings needed at this time will be a list of your home medications and if applicable your C-PAP/BI-PAP machine.  A Pre-op nurse will start an IV and you may receive medication in preparation for surgery, including something to help you relax.     Please be aware that surgery does come with discomfort.  We want to make  every effort to control your discomfort so please discuss any uncontrolled symptoms with your nurse.   Your doctor will most likely have prescribed pain medications.      If you are going home after surgery you will receive individualized written care instructions before being discharged.  A responsible adult must drive you to and from the hospital on the day of your surgery and stay with you for 24 hours.  Discharge prescriptions can be filled by the hospital pharmacy during regular pharmacy hours.  If you are having surgery late in the day/evening your prescription may be e-prescribed to your pharmacy.  Please verify your pharmacy hours or chose a 24 hour pharmacy to avoid not having access to your prescription because your pharmacy has closed for the day.    If you are staying overnight following surgery, you will be transported to your hospital room following the recovery period.  [facility] has all private rooms.    If you have any questions please call Pre-Admission Testing at [phone].  Deductibles and co-payments are collected on the day of service. Please be prepared to pay the required co-pay, deductible or deposit on the day of service as defined by your plan.    Call your surgeon immediately if you experience any of the following symptoms:  Sore Throat  Shortness of Breath or difficulty breathing  Cough  Chills  Body soreness or muscle pain  Headache  Fever  New loss of taste or smell  Do not arrive for your surgery ill.  Your procedure will need to be rescheduled to another time.  You will need to call your physician before the day of surgery to avoid any unnecessary exposure to hospital staff as well as other patients.

## 2022-12-19 ENCOUNTER — ANESTHESIA (OUTPATIENT)
Dept: PERIOP | Facility: HOSPITAL | Age: 42
End: 2022-12-19

## 2022-12-19 ENCOUNTER — APPOINTMENT (OUTPATIENT)
Dept: GENERAL RADIOLOGY | Facility: HOSPITAL | Age: 42
End: 2022-12-19

## 2022-12-19 ENCOUNTER — HOSPITAL ENCOUNTER (OUTPATIENT)
Facility: HOSPITAL | Age: 42
Setting detail: HOSPITAL OUTPATIENT SURGERY
Discharge: HOME | End: 2022-12-19
Attending: NEUROLOGICAL SURGERY | Admitting: NEUROLOGICAL SURGERY

## 2022-12-19 ENCOUNTER — TELEPHONE (OUTPATIENT)
Dept: NEUROSURGERY | Facility: CLINIC | Age: 42
End: 2022-12-19

## 2022-12-19 ENCOUNTER — ANESTHESIA EVENT (OUTPATIENT)
Dept: PERIOP | Facility: HOSPITAL | Age: 42
End: 2022-12-19

## 2022-12-19 VITALS
OXYGEN SATURATION: 99 % | SYSTOLIC BLOOD PRESSURE: 105 MMHG | TEMPERATURE: 97.8 F | HEART RATE: 75 BPM | RESPIRATION RATE: 16 BRPM | DIASTOLIC BLOOD PRESSURE: 72 MMHG

## 2022-12-19 DIAGNOSIS — M51.16 LUMBAR DISC HERNIATION WITH RADICULOPATHY: Primary | ICD-10-CM

## 2022-12-19 PROCEDURE — 25010000002 HYDROMORPHONE 1 MG/ML SOLUTION: Performed by: NURSE ANESTHETIST, CERTIFIED REGISTERED

## 2022-12-19 PROCEDURE — 25010000002 DEXAMETHASONE SODIUM PHOSPHATE 20 MG/5ML SOLUTION: Performed by: NURSE ANESTHETIST, CERTIFIED REGISTERED

## 2022-12-19 PROCEDURE — 25010000002 DROPERIDOL PER 5 MG: Performed by: NURSE ANESTHETIST, CERTIFIED REGISTERED

## 2022-12-19 PROCEDURE — 63030 LAMOT DCMPRN NRV RT 1 LMBR: CPT | Performed by: NEUROLOGICAL SURGERY

## 2022-12-19 PROCEDURE — 25010000002 MIDAZOLAM PER 1 MG: Performed by: ANESTHESIOLOGY

## 2022-12-19 PROCEDURE — 25010000002 NALOXONE PER 1 MG: Performed by: NURSE ANESTHETIST, CERTIFIED REGISTERED

## 2022-12-19 PROCEDURE — 25010000002 FENTANYL CITRATE (PF) 50 MCG/ML SOLUTION: Performed by: NURSE ANESTHETIST, CERTIFIED REGISTERED

## 2022-12-19 PROCEDURE — 25010000002 PROPOFOL 10 MG/ML EMULSION: Performed by: NURSE ANESTHETIST, CERTIFIED REGISTERED

## 2022-12-19 PROCEDURE — 25010000002 CEFAZOLIN IN DEXTROSE 2-4 GM/100ML-% SOLUTION: Performed by: NEUROLOGICAL SURGERY

## 2022-12-19 PROCEDURE — 25010000002 ONDANSETRON PER 1 MG: Performed by: NURSE ANESTHETIST, CERTIFIED REGISTERED

## 2022-12-19 PROCEDURE — 63030 LAMOT DCMPRN NRV RT 1 LMBR: CPT | Performed by: SPECIALIST/TECHNOLOGIST, OTHER

## 2022-12-19 PROCEDURE — 25010000002 KETOROLAC TROMETHAMINE PER 15 MG: Performed by: NURSE ANESTHETIST, CERTIFIED REGISTERED

## 2022-12-19 PROCEDURE — 72020 X-RAY EXAM OF SPINE 1 VIEW: CPT

## 2022-12-19 DEVICE — BONE WAX
Type: IMPLANTABLE DEVICE | Site: SPINE LUMBAR | Status: FUNCTIONAL
Brand: ETHICON

## 2022-12-19 DEVICE — FLOSEAL HEMOSTATIC MATRIX, 10ML
Type: IMPLANTABLE DEVICE | Site: SPINE LUMBAR | Status: FUNCTIONAL
Brand: FLOSEAL HEMOSTATIC MATRIX

## 2022-12-19 RX ORDER — DIPHENHYDRAMINE HCL 25 MG
25 CAPSULE ORAL
Status: DISCONTINUED | OUTPATIENT
Start: 2022-12-19 | End: 2022-12-19 | Stop reason: HOSPADM

## 2022-12-19 RX ORDER — ONDANSETRON 2 MG/ML
INJECTION INTRAMUSCULAR; INTRAVENOUS AS NEEDED
Status: DISCONTINUED | OUTPATIENT
Start: 2022-12-19 | End: 2022-12-19 | Stop reason: SURG

## 2022-12-19 RX ORDER — CYCLOBENZAPRINE HCL 10 MG
10 TABLET ORAL 3 TIMES DAILY PRN
Status: CANCELLED | OUTPATIENT
Start: 2022-12-19

## 2022-12-19 RX ORDER — DROPERIDOL 2.5 MG/ML
INJECTION, SOLUTION INTRAMUSCULAR; INTRAVENOUS AS NEEDED
Status: DISCONTINUED | OUTPATIENT
Start: 2022-12-19 | End: 2022-12-19 | Stop reason: SURG

## 2022-12-19 RX ORDER — PROMETHAZINE HYDROCHLORIDE 25 MG/1
25 TABLET ORAL ONCE AS NEEDED
Status: DISCONTINUED | OUTPATIENT
Start: 2022-12-19 | End: 2022-12-19 | Stop reason: HOSPADM

## 2022-12-19 RX ORDER — DEXAMETHASONE SODIUM PHOSPHATE 4 MG/ML
INJECTION, SOLUTION INTRA-ARTICULAR; INTRALESIONAL; INTRAMUSCULAR; INTRAVENOUS; SOFT TISSUE AS NEEDED
Status: DISCONTINUED | OUTPATIENT
Start: 2022-12-19 | End: 2022-12-19 | Stop reason: SURG

## 2022-12-19 RX ORDER — KETOROLAC TROMETHAMINE 30 MG/ML
15 INJECTION, SOLUTION INTRAMUSCULAR; INTRAVENOUS EVERY 6 HOURS PRN
Status: CANCELLED | OUTPATIENT
Start: 2022-12-19 | End: 2022-12-20

## 2022-12-19 RX ORDER — NALOXONE HYDROCHLORIDE 0.4 MG/ML
INJECTION, SOLUTION INTRAMUSCULAR; INTRAVENOUS; SUBCUTANEOUS AS NEEDED
Status: DISCONTINUED | OUTPATIENT
Start: 2022-12-19 | End: 2022-12-19 | Stop reason: SURG

## 2022-12-19 RX ORDER — LIDOCAINE HYDROCHLORIDE 20 MG/ML
INJECTION, SOLUTION INFILTRATION; PERINEURAL AS NEEDED
Status: DISCONTINUED | OUTPATIENT
Start: 2022-12-19 | End: 2022-12-19 | Stop reason: SURG

## 2022-12-19 RX ORDER — SODIUM CHLORIDE 0.9 % (FLUSH) 0.9 %
3-10 SYRINGE (ML) INJECTION AS NEEDED
Status: DISCONTINUED | OUTPATIENT
Start: 2022-12-19 | End: 2022-12-19 | Stop reason: HOSPADM

## 2022-12-19 RX ORDER — FAMOTIDINE 10 MG/ML
20 INJECTION, SOLUTION INTRAVENOUS ONCE
Status: COMPLETED | OUTPATIENT
Start: 2022-12-19 | End: 2022-12-19

## 2022-12-19 RX ORDER — BUPIVACAINE HYDROCHLORIDE AND EPINEPHRINE 5; 5 MG/ML; UG/ML
INJECTION, SOLUTION EPIDURAL; INTRACAUDAL; PERINEURAL AS NEEDED
Status: DISCONTINUED | OUTPATIENT
Start: 2022-12-19 | End: 2022-12-19 | Stop reason: HOSPADM

## 2022-12-19 RX ORDER — ONDANSETRON 2 MG/ML
4 INJECTION INTRAMUSCULAR; INTRAVENOUS EVERY 6 HOURS PRN
Status: CANCELLED | OUTPATIENT
Start: 2022-12-19

## 2022-12-19 RX ORDER — HYDROCODONE BITARTRATE AND ACETAMINOPHEN 7.5; 325 MG/1; MG/1
2 TABLET ORAL EVERY 4 HOURS PRN
Status: CANCELLED | OUTPATIENT
Start: 2022-12-19 | End: 2022-12-29

## 2022-12-19 RX ORDER — FENTANYL CITRATE 50 UG/ML
50 INJECTION, SOLUTION INTRAMUSCULAR; INTRAVENOUS
Status: DISCONTINUED | OUTPATIENT
Start: 2022-12-19 | End: 2022-12-19 | Stop reason: HOSPADM

## 2022-12-19 RX ORDER — HYDROCODONE BITARTRATE AND ACETAMINOPHEN 7.5; 325 MG/1; MG/1
1 TABLET ORAL ONCE AS NEEDED
Status: COMPLETED | OUTPATIENT
Start: 2022-12-19 | End: 2022-12-19

## 2022-12-19 RX ORDER — MIDAZOLAM HYDROCHLORIDE 1 MG/ML
1 INJECTION INTRAMUSCULAR; INTRAVENOUS
Status: COMPLETED | OUTPATIENT
Start: 2022-12-19 | End: 2022-12-19

## 2022-12-19 RX ORDER — ROCURONIUM BROMIDE 10 MG/ML
INJECTION, SOLUTION INTRAVENOUS AS NEEDED
Status: DISCONTINUED | OUTPATIENT
Start: 2022-12-19 | End: 2022-12-19 | Stop reason: SURG

## 2022-12-19 RX ORDER — HYDROMORPHONE HYDROCHLORIDE 1 MG/ML
0.5 INJECTION, SOLUTION INTRAMUSCULAR; INTRAVENOUS; SUBCUTANEOUS
Status: DISCONTINUED | OUTPATIENT
Start: 2022-12-19 | End: 2022-12-19 | Stop reason: HOSPADM

## 2022-12-19 RX ORDER — HYDRALAZINE HYDROCHLORIDE 20 MG/ML
5 INJECTION INTRAMUSCULAR; INTRAVENOUS
Status: DISCONTINUED | OUTPATIENT
Start: 2022-12-19 | End: 2022-12-19 | Stop reason: HOSPADM

## 2022-12-19 RX ORDER — SODIUM CHLORIDE 9 MG/ML
40 INJECTION, SOLUTION INTRAVENOUS AS NEEDED
Status: CANCELLED | OUTPATIENT
Start: 2022-12-19

## 2022-12-19 RX ORDER — ONDANSETRON 2 MG/ML
4 INJECTION INTRAMUSCULAR; INTRAVENOUS ONCE AS NEEDED
Status: DISCONTINUED | OUTPATIENT
Start: 2022-12-19 | End: 2022-12-19 | Stop reason: HOSPADM

## 2022-12-19 RX ORDER — SODIUM CHLORIDE 0.9 % (FLUSH) 0.9 %
3 SYRINGE (ML) INJECTION EVERY 12 HOURS SCHEDULED
Status: DISCONTINUED | OUTPATIENT
Start: 2022-12-19 | End: 2022-12-19 | Stop reason: HOSPADM

## 2022-12-19 RX ORDER — AMOXICILLIN 250 MG
1 CAPSULE ORAL NIGHTLY PRN
Status: CANCELLED | OUTPATIENT
Start: 2022-12-19

## 2022-12-19 RX ORDER — MORPHINE SULFATE 2 MG/ML
2 INJECTION, SOLUTION INTRAMUSCULAR; INTRAVENOUS EVERY 4 HOURS PRN
Status: CANCELLED | OUTPATIENT
Start: 2022-12-19 | End: 2022-12-29

## 2022-12-19 RX ORDER — ACETAMINOPHEN 325 MG/1
650 TABLET ORAL EVERY 4 HOURS PRN
Status: CANCELLED | OUTPATIENT
Start: 2022-12-19

## 2022-12-19 RX ORDER — NALOXONE HCL 0.4 MG/ML
0.2 VIAL (ML) INJECTION AS NEEDED
Status: DISCONTINUED | OUTPATIENT
Start: 2022-12-19 | End: 2022-12-19 | Stop reason: HOSPADM

## 2022-12-19 RX ORDER — KETOROLAC TROMETHAMINE 30 MG/ML
INJECTION, SOLUTION INTRAMUSCULAR; INTRAVENOUS AS NEEDED
Status: DISCONTINUED | OUTPATIENT
Start: 2022-12-19 | End: 2022-12-19 | Stop reason: SURG

## 2022-12-19 RX ORDER — FENTANYL CITRATE 50 UG/ML
INJECTION, SOLUTION INTRAMUSCULAR; INTRAVENOUS AS NEEDED
Status: DISCONTINUED | OUTPATIENT
Start: 2022-12-19 | End: 2022-12-19 | Stop reason: SURG

## 2022-12-19 RX ORDER — EPHEDRINE SULFATE 50 MG/ML
5 INJECTION, SOLUTION INTRAVENOUS ONCE AS NEEDED
Status: DISCONTINUED | OUTPATIENT
Start: 2022-12-19 | End: 2022-12-19 | Stop reason: HOSPADM

## 2022-12-19 RX ORDER — SODIUM CHLORIDE 0.9 % (FLUSH) 0.9 %
3 SYRINGE (ML) INJECTION EVERY 12 HOURS SCHEDULED
Status: CANCELLED | OUTPATIENT
Start: 2022-12-19

## 2022-12-19 RX ORDER — PROMETHAZINE HYDROCHLORIDE 25 MG/1
25 SUPPOSITORY RECTAL ONCE AS NEEDED
Status: DISCONTINUED | OUTPATIENT
Start: 2022-12-19 | End: 2022-12-19 | Stop reason: HOSPADM

## 2022-12-19 RX ORDER — HYDROCODONE BITARTRATE AND ACETAMINOPHEN 7.5; 325 MG/1; MG/1
1 TABLET ORAL EVERY 4 HOURS PRN
Status: CANCELLED | OUTPATIENT
Start: 2022-12-19 | End: 2022-12-29

## 2022-12-19 RX ORDER — PROPOFOL 10 MG/ML
VIAL (ML) INTRAVENOUS AS NEEDED
Status: DISCONTINUED | OUTPATIENT
Start: 2022-12-19 | End: 2022-12-19 | Stop reason: SURG

## 2022-12-19 RX ORDER — SODIUM CHLORIDE 0.9 % (FLUSH) 0.9 %
10 SYRINGE (ML) INJECTION AS NEEDED
Status: CANCELLED | OUTPATIENT
Start: 2022-12-19

## 2022-12-19 RX ORDER — HYDROCODONE BITARTRATE AND ACETAMINOPHEN 7.5; 325 MG/1; MG/1
1 TABLET ORAL EVERY 6 HOURS PRN
Qty: 40 TABLET | Refills: 0 | Status: SHIPPED | OUTPATIENT
Start: 2022-12-19 | End: 2023-01-31

## 2022-12-19 RX ORDER — ONDANSETRON 4 MG/1
4 TABLET, FILM COATED ORAL EVERY 6 HOURS PRN
Status: CANCELLED | OUTPATIENT
Start: 2022-12-19

## 2022-12-19 RX ORDER — LIDOCAINE HYDROCHLORIDE 10 MG/ML
0.5 INJECTION, SOLUTION EPIDURAL; INFILTRATION; INTRACAUDAL; PERINEURAL ONCE AS NEEDED
Status: DISCONTINUED | OUTPATIENT
Start: 2022-12-19 | End: 2022-12-19 | Stop reason: HOSPADM

## 2022-12-19 RX ORDER — FLUMAZENIL 0.1 MG/ML
0.2 INJECTION INTRAVENOUS AS NEEDED
Status: DISCONTINUED | OUTPATIENT
Start: 2022-12-19 | End: 2022-12-19 | Stop reason: HOSPADM

## 2022-12-19 RX ORDER — SODIUM CHLORIDE, SODIUM LACTATE, POTASSIUM CHLORIDE, CALCIUM CHLORIDE 600; 310; 30; 20 MG/100ML; MG/100ML; MG/100ML; MG/100ML
9 INJECTION, SOLUTION INTRAVENOUS CONTINUOUS
Status: DISCONTINUED | OUTPATIENT
Start: 2022-12-19 | End: 2022-12-19 | Stop reason: HOSPADM

## 2022-12-19 RX ORDER — CYCLOBENZAPRINE HCL 10 MG
5 TABLET ORAL NIGHTLY
Qty: 60 TABLET | Refills: 2 | Status: SHIPPED | OUTPATIENT
Start: 2022-12-19 | End: 2023-01-31

## 2022-12-19 RX ORDER — CEFAZOLIN SODIUM 2 G/100ML
2 INJECTION, SOLUTION INTRAVENOUS ONCE
Status: COMPLETED | OUTPATIENT
Start: 2022-12-19 | End: 2022-12-19

## 2022-12-19 RX ORDER — MAGNESIUM HYDROXIDE 1200 MG/15ML
LIQUID ORAL AS NEEDED
Status: DISCONTINUED | OUTPATIENT
Start: 2022-12-19 | End: 2022-12-19 | Stop reason: HOSPADM

## 2022-12-19 RX ORDER — LABETALOL HYDROCHLORIDE 5 MG/ML
5 INJECTION, SOLUTION INTRAVENOUS
Status: DISCONTINUED | OUTPATIENT
Start: 2022-12-19 | End: 2022-12-19 | Stop reason: HOSPADM

## 2022-12-19 RX ORDER — NALOXONE HCL 0.4 MG/ML
0.4 VIAL (ML) INJECTION
Status: CANCELLED | OUTPATIENT
Start: 2022-12-19

## 2022-12-19 RX ORDER — DIPHENHYDRAMINE HYDROCHLORIDE 50 MG/ML
12.5 INJECTION INTRAMUSCULAR; INTRAVENOUS
Status: DISCONTINUED | OUTPATIENT
Start: 2022-12-19 | End: 2022-12-19 | Stop reason: HOSPADM

## 2022-12-19 RX ORDER — OXYCODONE AND ACETAMINOPHEN 7.5; 325 MG/1; MG/1
1 TABLET ORAL EVERY 4 HOURS PRN
Status: DISCONTINUED | OUTPATIENT
Start: 2022-12-19 | End: 2022-12-19 | Stop reason: HOSPADM

## 2022-12-19 RX ORDER — SODIUM CHLORIDE AND POTASSIUM CHLORIDE 150; 900 MG/100ML; MG/100ML
75 INJECTION, SOLUTION INTRAVENOUS CONTINUOUS
Status: CANCELLED | OUTPATIENT
Start: 2022-12-19

## 2022-12-19 RX ADMIN — MIDAZOLAM 1 MG: 1 INJECTION INTRAMUSCULAR; INTRAVENOUS at 07:05

## 2022-12-19 RX ADMIN — SODIUM CHLORIDE, POTASSIUM CHLORIDE, SODIUM LACTATE AND CALCIUM CHLORIDE 9 ML/HR: 600; 310; 30; 20 INJECTION, SOLUTION INTRAVENOUS at 07:06

## 2022-12-19 RX ADMIN — FENTANYL CITRATE 50 MCG: 50 INJECTION, SOLUTION INTRAMUSCULAR; INTRAVENOUS at 09:58

## 2022-12-19 RX ADMIN — LIDOCAINE HYDROCHLORIDE 100 MG: 20 INJECTION, SOLUTION INFILTRATION; PERINEURAL at 07:36

## 2022-12-19 RX ADMIN — HYDROMORPHONE HYDROCHLORIDE 0.5 MG: 1 INJECTION, SOLUTION INTRAMUSCULAR; INTRAVENOUS; SUBCUTANEOUS at 08:20

## 2022-12-19 RX ADMIN — PROPOFOL 200 MG: 10 INJECTION, EMULSION INTRAVENOUS at 07:36

## 2022-12-19 RX ADMIN — FENTANYL CITRATE 50 MCG: 50 INJECTION, SOLUTION INTRAMUSCULAR; INTRAVENOUS at 07:36

## 2022-12-19 RX ADMIN — DEXAMETHASONE SODIUM PHOSPHATE 8 MG: 4 INJECTION, SOLUTION INTRAMUSCULAR; INTRAVENOUS at 07:54

## 2022-12-19 RX ADMIN — MIDAZOLAM 1 MG: 1 INJECTION INTRAMUSCULAR; INTRAVENOUS at 07:16

## 2022-12-19 RX ADMIN — DROPERIDOL 0.62 MG: 2.5 INJECTION, SOLUTION INTRAMUSCULAR; INTRAVENOUS at 09:06

## 2022-12-19 RX ADMIN — NALXONE HYDROCHLORIDE 0.08 MG: 0.4 INJECTION INTRAMUSCULAR; INTRAVENOUS; SUBCUTANEOUS at 09:01

## 2022-12-19 RX ADMIN — CEFAZOLIN SODIUM 2 G: 2 INJECTION, SOLUTION INTRAVENOUS at 07:25

## 2022-12-19 RX ADMIN — SUGAMMADEX 200 MG: 100 INJECTION, SOLUTION INTRAVENOUS at 08:45

## 2022-12-19 RX ADMIN — HYDROCODONE BITARTRATE AND ACETAMINOPHEN 1 TABLET: 7.5; 325 TABLET ORAL at 09:59

## 2022-12-19 RX ADMIN — ROCURONIUM BROMIDE 50 MG: 10 INJECTION, SOLUTION INTRAVENOUS at 07:36

## 2022-12-19 RX ADMIN — SODIUM CHLORIDE, POTASSIUM CHLORIDE, SODIUM LACTATE AND CALCIUM CHLORIDE 9 ML/HR: 600; 310; 30; 20 INJECTION, SOLUTION INTRAVENOUS at 09:42

## 2022-12-19 RX ADMIN — ONDANSETRON 4 MG: 2 INJECTION INTRAMUSCULAR; INTRAVENOUS at 08:41

## 2022-12-19 RX ADMIN — KETOROLAC TROMETHAMINE 30 MG: 30 INJECTION, SOLUTION INTRAMUSCULAR; INTRAVENOUS at 08:42

## 2022-12-19 RX ADMIN — FAMOTIDINE 20 MG: 10 INJECTION INTRAVENOUS at 07:05

## 2022-12-19 NOTE — DISCHARGE INSTRUCTIONS
[facility]  [address], [address]  [place] [address]  Phone:  [phone]  Fax:  [phone]    Dr. Yogesh Moran MD FACS FAANS            Low Back Surgery Post-Operative Instructions  (Lumbar Discectomy or Lumbar Decompression)        Change your bandage in about 24 hours following surgery and you may replace with another guaze bandage or leave it off.  Check the cut (incision) made by the surgeon twice a day for signs of infection.  Some signs of infection may include:  A foul smelling, greenish or yellowish discharge form the wound.  Increased pain  Increased redness over the incision (operative) site  Skin edges separate  Flu-like symptoms (problems)  A temperature above 101.5° F (38.6° C) .    You may resume normal diet as you tolerate    No lifting overhead, although you may place your arms above your head.  DO NOT lift anything over five (5) pounds.  Walking is allowed and encouraged. There are no restrictions on sitting or climbing stairs, but refrain from overly strenuous activity.  You may notice that a constant position (standing or sitting) greater than 45 minutes may tend to make you more sore and therefore it is recommended that you change positions frequently. Do not drive until you are seen back for your first postoperative visit, although you may be a passenger in a car.      Walking is permitted.  You may use a treadmill without an incline.  Back off on activity if you have discomfort.  You may also use stairs as much as you can tolerate, just take it slow and easy.    Refrain from any sexual activity until after your first evaluation at the office.     Back pain after surgery may worsen 2 to 3 days following surgery.  It should smooth out after the third day.  Continue the pain medication and muscle relaxers as prescribed.  You may also need to take a stool softener like Senokot-S if you develop constipation.    You may shower and pat the incision dry, but do not soak  your wound in water such as a swimming pool, hot tub or lake.   Avoid direct sunlight to the wound for at least three (3) months.  You may apply ice to the surgical site for 15 to 20 minutes each hour while awake for the first few days following surgery.  Simply put the ice in a plastic bag in place a towel between the bag of ice and your skin.    You have Steri-Strips applied to the skin edges of your incision.  They should fall off in 7 to 10 days, however, if they do not fall off by the 10th day you may remove them.    You will leave the hospital with prescriptions for pain medicine and a muscle relaxer.  These medications must be taken as prescribed only.  If a refill of your pain medication is required, in order to have this medication refilled, you must contact the office 3 days (72 hrs) prior to running out, but the amount prescribed is generally enough to last until the first post-operative visit.      A small amount of bleeding from the incision during the first few days is not unusual.       You should have a post-operative visit approximately 2 weeks after surgery.  If you do not have a scheduled appointment, call the office at [phone] to schedule one.  We will discuss return to work at your first post-operative visit, but you can expect to be off of work for an average of 6 weeks.     Notify the office if there are any problems, such as:    Excessive back or leg pain   If you lose control of urine or bowel movements  Persistent temperature of 101.5° F (38.6° C) or greater that is not relieved by Tylenol.  Excessive bleeding, redness, heat, leakage of fluid, swelling or pus around the incision   If you develop difficulty breathing, have chest pain or shortness of breath, call 911.  If you develop swelling in the calf or leg  Your pain is not controlled with medication  You seem to be getting worse rather than better    Thank you.

## 2022-12-19 NOTE — TELEPHONE ENCOUNTER
----- Message from Yogesh Moran MD sent at 12/19/2022 10:02 AM EST -----  Regarding: PT after first postop visit  Patient wanted assistance in scheduling PT immediately after his first postop visit because scheduling in PT is so backed up and the issues he had with insurance approvals.  I think they can do this themselves but they may need some direction or a PT order.

## 2022-12-19 NOTE — ANESTHESIA PROCEDURE NOTES
Airway  Urgency: elective    Date/Time: 12/19/2022 7:38 AM  Airway not difficult    General Information and Staff    Patient location during procedure: OR  CRNA/CAA: Jaye Dodson CRNA    Indications and Patient Condition  Indications for airway management: airway protection    Preoxygenated: yes  MILS not maintained throughout  Mask difficulty assessment: 1 - vent by mask    Final Airway Details  Final airway type: endotracheal airway      Successful airway: ETT  Cuffed: yes   Successful intubation technique: direct laryngoscopy  Endotracheal tube insertion site: oral  Blade: Saul  Blade size: 4  ETT size (mm): 7.5  Cormack-Lehane Classification: grade I - full view of glottis  Placement verified by: chest auscultation and capnometry   Measured from: lips  ETT/EBT  to lips (cm): 23  Number of attempts at approach: 1  Assessment: lips, teeth, and gum same as pre-op and atraumatic intubation    Additional Comments  Dentition intact and unchanged. CBEBS.  +ETCO2.

## 2022-12-19 NOTE — OP NOTE
Lumbar Microscopic Discectomy Procedure Note    Enoch Goddard  12/19/2022  [id number]    SURGEON  Yogesh Moran MD    Assistant:  Annette Wharton CSA was present throughout the entire surgery to provide intraoperative suction, retraction, suturing, and irrigation to promote visualization by the operative surgeon and assist with opening and closure.  The skilled assistance was necessary for the success of this case.  Our practice does not have a relationship with neurosurgical residents.    Indication:Intractable leg pain, S1 radiculopathy on the right    Pre-op Diagnosis:   Lumbar Herniated Nucleus Pulposus, L5-S1 on the Right    Post-op Diagnosis:     Lumbar Herniated Nucleus Pulposus, L5-S1 on the Right    Procedure Performed:  Lumbar Microscopic Discectomy, L5-S1 on the Right    1.  Laminotomy (Hemilaminectomy), with decompression of nerve root, including partial facetectomy, foraminotomy and excision of herniated intervertebral disc, L5-S1 on the Right.    2. Intraoperative Microdissection    Anesthesia: General    Staff:   Circulator: Ekta Barksdale RN  Radiology Technologist: Isabella Hughes  Scrub Person: Kim Locke  Assistant: Annette Wharton CSA    Estimated Blood Loss: minimal    Specimens: None     Drains: None    Findings: Large subannular disc herniation on the right at L5-S1 impinging the S1 nerve root into its neuroforamen    Complications: None immediate    Narrative Description:     Positioning:  After operative consent, the patient was taken to the operating room in stable condition and placed under general endotracheal intubation. Once adequate anesthesia was administered the patient was placed in the prone position on a Vernon frame, then the frame cranked into a flexion position. After adequate prepping and draping, needle localization of the L5-S1 level was confirmed and the skin was infiltrated with local anesthesia.    Approach:  The skin incision was taken down to the  superficial fascia which was then incised with the Bovie electrocautery. A subperiosteal dissection was performed down to the level of the facet joints. A Murray self-retaining retractor was placed in position on the right.    Operating Microscope: The operating microscope was draped using sterile technique and brought into the field and the rest of the operation was performed under operative magnification with microdissection technique and micro-illumination with the aid of the operating microscope.    Lumbar decompression:  The laminotomy was performed at L5 on the right with a pneumatic drill. The ligamentum flavum was resected with a Kerrison ronguer. The S1 nerve root was now decompressed and the microscope was now used for microdissection. Gentle palpation with a nerve hook along the S1 nerve root confirmed impingement by a large subannular disc herniation and after incising the annulus of the disc a large disc extrusion was removed from the right.  The neuroforamen and S1 nerve root have been decompressed following this maneuver and no residual stenosis was suspected.  Going through the annular defect with a micro pituitary rongeur additional loose nucleus pulposus material was removed to prevent early recurrence. The wound was irrigated and the Valsalva maneuver did not express CSF. The Coyne ball probe passed freely along the L5 and S1 nerve roots. Hemostasis was complete with Flow-Seal and bipolar electrocautery.    Closure:  The fascia was now closed with 0 Vicryl suture. The superficial subcutaneous tissues closed with 2-0 Vicryl suture and the skin with 3-0 Vicryl suture in a running subcuticular fashion. Iodine impregnated Steri-strips and a dry dressing was applied. The patient arose from anesthesia in stable condition and was transported to postop recovery.    Yogesh Moran MD     Date: 12/19/2022  Time: 09:03 EST

## 2022-12-19 NOTE — ANESTHESIA POSTPROCEDURE EVALUATION
Patient: Enoch Goddard    Procedure Summary     Date: 12/19/22 Room / Location: [facility] OR [address] [place] [address] MAIN OR    Anesthesia Start: 0729 Anesthesia Stop: 0910    Procedure: Lumbar Microscopic Discectomy, right lumbar 5 sacral 1 (Right: Spine Lumbar) Diagnosis:       Lumbar disc herniation with radiculopathy      (Lumbar disc herniation with radiculopathy [M51.16])    Surgeons: Yogesh Moran MD Provider: Maggie Duran MD    Anesthesia Type: general ASA Status: 3          Anesthesia Type: general    Vitals  Vitals Value Taken Time   /69 12/19/22 1031   Temp 36.6 °C (97.8 °F) 12/19/22 0908   Pulse 69 12/19/22 1040   Resp 16 12/19/22 1030   SpO2 97 % 12/19/22 1040   Vitals shown include unvalidated device data.        Post Anesthesia Care and Evaluation    Patient location during evaluation: PHASE II  Patient participation: complete - patient participated  Level of consciousness: awake  Pain score: 1  Pain management: satisfactory to patient  Anesthetic complications: No anesthetic complications  PONV Status: none  Cardiovascular status: acceptable  Respiratory status: acceptable  Hydration status: acceptable

## 2022-12-19 NOTE — ANESTHESIA PREPROCEDURE EVALUATION
Anesthesia Evaluation                  Airway   Mallampati: II  TM distance: >3 FB  Neck ROM: full  No difficulty expected  Dental - normal exam     Pulmonary - normal exam   Cardiovascular - normal exam    ECG reviewed        Neuro/Psych  (+) headaches, numbness,    GI/Hepatic/Renal/Endo    (+)  GERD,      Musculoskeletal     (+) back pain,   Abdominal    Substance History      OB/GYN          Other          Other Comment: Restless leg                  Anesthesia Plan    ASA 3     general     intravenous induction     Anesthetic plan, risks, benefits, and alternatives have been provided, discussed and informed consent has been obtained with: patient.        CODE STATUS:

## 2022-12-27 NOTE — PROGRESS NOTES
Subjective   History of Present Illness: Enoch Goddard is a 42 y.o. male is here today for follow-up. Mr. Goddard is 10 days out from having a right L5-S1 discectomy on 12-19-22.    Today, Mr. Goddard reports now 2 weeks status post lumbar microscopic discectomy on the right at L5-S1 he has tenderness in the back.  His right thigh continues to feel fairly tight.  He no longer takes the pain medication and prefers to take ibuprofen which does help relieve his pain.  He says within 2 days after surgery he felt so much better he had to hold himself back from doing more accelerated activity to maintain his activity within the rules following surgery.    While in the room and during my examination of the patient I wore a mask and eye protection.  I washed my hands before and after this patient encounter.  The patient was also wearing a mask.    History of Present Illness    Tobacco Use: Low Risk    • Smoking Tobacco Use: Never   • Smokeless Tobacco Use: Never   • Passive Exposure: Not on file        The following portions of the patient's history were reviewed and updated as appropriate: allergies, current medications, past family history, past medical history, past social history, past surgical history and problem list.    Review of Systems    Objective     Vitals:    12/29/22 1301   BP: 112/74   BP Location: Left arm   Patient Position: Sitting   Cuff Size: Adult   Pulse: 75   Temp: 97.5 °F (36.4 °C)   TempSrc: Temporal   SpO2: 98%   PainSc:   4   PainLoc: Back     There is no height or weight on file to calculate BMI.      Physical Exam  Neurologic Exam    Physical Exam:    CONSTITUTIONAL:  appears well developed, well-nourished and in no acute distress.    NECK: the neck is supple and symmetric. The trachea is midline with no masses.      PULMONARY: Respiratory effort is normal with no increased work of breathing or signs of respiratory distress.    CARDIOVASCULAR: Pedal pulses are +2/4 bilaterally. Examination  of the extremities shows no edema or varicosities.    MUSCULOSKELETAL: Gait normal    SKIN: The skin is warm, dry and intact.  Lumbar incision healing    NEUROLOGIC:   Normal motor strength noted. Muscle bulk and tone are normal.  Sensory exam is normal to fine touch  Straight leg raise test reveals right hamstring tightening only no sciatica  Reflexes are absent for both ankles as appropriate  Cortical function is intact and without deficits. Speech is normal.    PSYCHIATRIC: oriented to person, place and time. Patient's mood and affect are normal.    Assessment & Plan     Medical Decision Making:      The patient can now lift up to 25 lbs. and may soak the incision in a bath or pool if desired. They will arrange Physical Therapy 2-3 times per week for 3 weeks to initiate a home exercise plan. It is safe for the patient to drive if they are comfortable driving and not consuming narcotic pain medications. Follow up is arranged following PT. All questions were reviewed and answered.    Return in about 4 weeks (around 1/26/2023) for discussion of Physical Therapy results.    Diagnoses and all orders for this visit:    1. Status post lumbar surgery (Primary)  -     Ambulatory Referral to Physical Therapy Evaluate and treat    2. Lumbar disc herniation with radiculopathy  -     Ambulatory Referral to Physical Therapy Evaluate and treat             Yogesh Moran MD FACS FAANS  Neurological Surgery

## 2022-12-29 ENCOUNTER — OFFICE VISIT (OUTPATIENT)
Dept: NEUROSURGERY | Facility: CLINIC | Age: 42
End: 2022-12-29

## 2022-12-29 VITALS
DIASTOLIC BLOOD PRESSURE: 74 MMHG | OXYGEN SATURATION: 98 % | SYSTOLIC BLOOD PRESSURE: 112 MMHG | TEMPERATURE: 97.5 F | HEART RATE: 75 BPM

## 2022-12-29 DIAGNOSIS — M51.16 LUMBAR DISC HERNIATION WITH RADICULOPATHY: ICD-10-CM

## 2022-12-29 DIAGNOSIS — Z98.890 STATUS POST LUMBAR SURGERY: Primary | ICD-10-CM

## 2022-12-29 PROCEDURE — 99024 POSTOP FOLLOW-UP VISIT: CPT | Performed by: NEUROLOGICAL SURGERY

## 2022-12-29 RX ORDER — GLUCOSAMINE/MSM/CHONDROIT SULF 500-166.6
TABLET ORAL DAILY
COMMUNITY

## 2022-12-29 RX ORDER — IBUPROFEN 600 MG/1
600 TABLET ORAL EVERY 6 HOURS PRN
COMMUNITY

## 2023-01-11 ENCOUNTER — TELEPHONE (OUTPATIENT)
Dept: NEUROSURGERY | Facility: CLINIC | Age: 43
End: 2023-01-11
Payer: COMMERCIAL

## 2023-01-11 NOTE — TELEPHONE ENCOUNTER
I called and spoke with Chika and let her know, no lifting greater than 25 pounds. She voiced understanding.

## 2023-01-11 NOTE — TELEPHONE ENCOUNTER
Physical Therapy in [place] called stated patient did not know if he had any restrictions on PT . Please call Chika [phone] Please leave a VM if they do not answer.

## 2023-01-13 ENCOUNTER — TELEPHONE (OUTPATIENT)
Dept: NEUROSURGERY | Facility: CLINIC | Age: 43
End: 2023-01-13
Payer: COMMERCIAL

## 2023-01-13 RX ORDER — METHYLPREDNISOLONE 4 MG/1
TABLET ORAL
Qty: 1 EACH | Refills: 0 | Status: SHIPPED | OUTPATIENT
Start: 2023-01-13 | End: 2023-01-31

## 2023-01-13 NOTE — TELEPHONE ENCOUNTER
That is not unusual as you start getting more active the nerve is still bruised.  I will place Rx for Medrol dose pack if it doesn't improve over the next day or so he can try that.  I sent to [facility] in [facility] as I wasn't sure of the hours of his preferred [facility]

## 2023-01-13 NOTE — TELEPHONE ENCOUNTER
Patient called had a right L5-S1 discectomy on 12-19-22. He is calling to complain of right hip pain and leg pain. He did start PT yesterday. I explained that depending on how bad his nerves were compressed can depend on his recovery starting PT. He reports he has been more active around the house. He is very uncomfortable and concerned

## 2023-01-25 NOTE — PROGRESS NOTES
"Subjective   History of Present Illness: Enoch Goddard is a 42 y.o. male is here today for follow-up after physical therapy. Mr. Goddard is six weeks out from having a right L5-S1 discectomy on 12-19-22.    Today, Mr. Goddard reports he was feeling better until about 01-27-23 but is unaware of any injury except for carrying a pot of water up and down the stairs. He is having left leg pain with tingling. He has finished PT    While in the room and during my examination of the patient I wore a mask and eye protection.  I washed my hands before and after this patient encounter.  The patient was also wearing a mask.    History of Present Illness    Tobacco Use: Low Risk    • Smoking Tobacco Use: Never   • Smokeless Tobacco Use: Never   • Passive Exposure: Not on file        The following portions of the patient's history were reviewed and updated as appropriate: allergies, current medications, past family history, past medical history, past social history, past surgical history and problem list.    Review of Systems    Objective     Vitals:    01/31/23 1244   BP: 130/82   Weight: 74.4 kg (164 lb)   Height: 182.9 cm (72\")     Body mass index is 22.24 kg/m².      Physical Exam  Neurologic Exam    Physical Exam:    CONSTITUTIONAL:  appears well developed, well-nourished and in no acute distress.    NECK: the neck is supple and symmetric. The trachea is midline with no masses.      PULMONARY: Respiratory effort is normal with no increased work of breathing or signs of respiratory distress.    CARDIOVASCULAR: Pedal pulses are +2/4 bilaterally. Examination of the extremities shows no edema or varicosities.    MUSCULOSKELETAL: Gait normal with upright posture no longer has lumbago or guarding of the right or left leg    SKIN: The skin is warm, dry and intact.  Lumbar incision healed    NEUROLOGIC:   Normal motor strength noted. Muscle bulk and tone are normal.  Sensory exam is normal to fine touch  Reflexes present in the " left ankle absent in the right ankle  Straight leg raising test negative bilaterally although he gets a little hamstring tightness on the right  Cortical function is intact and without deficits. Speech is normal.    PSYCHIATRIC: oriented to person, place and time. Patient's mood and affect are normal.      Assessment & Plan     Medical Decision Making:      From the surgery perspective the healing has progressed to allow resumption of normal preoperative activity including work.  He has developed a little of contralateral left leg pain but no neurologic deficits to correlate with a specific radiculopathy.  I think this is inflammatory in nature and did improve with the inflammatories.  We encouraged continuation of the therapy exercises on his own as he eases back to work starting with half days next week followed by full-time the week after.    Return in about 4 weeks (around 2/28/2023) for routine follow up.    Diagnoses and all orders for this visit:    1. Status post lumbar surgery (Primary)    2. Lumbar disc herniation with radiculopathy             Yogesh Moran MD FACS FAANS  Neurological Surgery

## 2023-01-31 ENCOUNTER — OFFICE VISIT (OUTPATIENT)
Dept: NEUROSURGERY | Facility: CLINIC | Age: 43
End: 2023-01-31
Payer: COMMERCIAL

## 2023-01-31 VITALS
WEIGHT: 164 LBS | DIASTOLIC BLOOD PRESSURE: 82 MMHG | HEIGHT: 72 IN | SYSTOLIC BLOOD PRESSURE: 130 MMHG | BODY MASS INDEX: 22.21 KG/M2

## 2023-01-31 DIAGNOSIS — Z98.890 STATUS POST LUMBAR SURGERY: Primary | ICD-10-CM

## 2023-01-31 DIAGNOSIS — M51.16 LUMBAR DISC HERNIATION WITH RADICULOPATHY: ICD-10-CM

## 2023-01-31 PROCEDURE — 99024 POSTOP FOLLOW-UP VISIT: CPT | Performed by: NEUROLOGICAL SURGERY

## 2023-02-25 NOTE — PROGRESS NOTES
Subjective   History of Present Illness: Enoch Goddard is a 43 y.o. male is here today for follow-up after physical therapy. Mr. Goddard had a right L5-S1 discectomy on 12-19-22.    Today, Mr. Goddard reports low back pain with radiating numbness and tingling down left leg.  Recall that his preoperative pain was right sciatica and S1 radiculopathy.  He reports a painful spot above his incision that is point tender at times and is about 8 to 10 inches above where his incision is in the midline.  Denies loss of bowel/bladder.  He  has continued the therapy stretching outlined after the surgery.  He remains concerned about the persistent proximal left leg pain.    While in the room and during my examination of the patient I wore a mask and eye protection.  I washed my hands before and after this patient encounter.  The patient was also wearing a mask.    Back Pain  This is a recurrent problem. The current episode started more than 1 month ago. The problem occurs 2 to 4 times per day. The problem is unchanged. The pain is present in the lumbar spine. The quality of the pain is described as stabbing. The pain radiates to the left thigh and left foot. The pain is at a severity of 4/10. The pain is moderate. The pain is the same all the time. The symptoms are aggravated by sitting and twisting. Stiffness is present all day. Associated symptoms include numbness. Pertinent negatives include no weakness. He has tried home exercises and walking for the symptoms. The treatment provided moderate relief.       Tobacco Use: Low Risk    • Smoking Tobacco Use: Never   • Smokeless Tobacco Use: Never   • Passive Exposure: Not on file        The following portions of the patient's history were reviewed and updated as appropriate: allergies, current medications, past family history, past medical history, past social history, past surgical history and problem list.    Review of Systems   Constitutional: Negative for activity change.    HENT: Negative.    Eyes: Negative.    Respiratory: Negative for chest tightness and shortness of breath.    Gastrointestinal: Negative for constipation, diarrhea, nausea and vomiting.   Genitourinary: Negative for difficulty urinating and enuresis.   Musculoskeletal: Positive for back pain. Negative for gait problem.   Neurological: Positive for numbness. Negative for weakness.   Psychiatric/Behavioral: Positive for sleep disturbance.       Objective     Vitals:    02/28/23 1235   BP: 102/82   Pulse: 74   SpO2: 97%     There is no height or weight on file to calculate BMI.      Physical Exam  Neurologic Exam    Physical Exam:    CONSTITUTIONAL:  appears well developed, well-nourished and in no acute distress the patient is able to sit and stand comfortably.    NECK: the neck is supple and symmetric. The trachea is midline with no masses.      PULMONARY: Respiratory effort is normal with no increased work of breathing or signs of respiratory distress.    CARDIOVASCULAR: Pedal pulses are +2/4 bilaterally. Examination of the extremities shows no edema or varicosities.    MUSCULOSKELETAL: Gait normal    SKIN: The skin is warm, dry and intact.    NEUROLOGIC:   Normal motor strength noted to confrontational testing of both lower extremities. Muscle bulk and tone are normal.  Sensory exam is normal to fine touch both lower extremities  Reflexes still present in the left ankle and absent in the right ankle  Straight leg raising test negative on the right, hamstring tightening on the left with a little pain around the greater trochanter on the left  Cortical function is intact and without deficits. Speech is normal.    PSYCHIATRIC: oriented to person, place and time. Patient's mood and affect are normal.      Assessment & Plan     Medical Decision Making:      Given the patient's persistent symptoms in the left leg which are new since tolerating his activity and with an episode of lifting a pot of water about a month ago,  I am going to order an MRI of the lumbar spine with and without contrast to assess the postoperative anatomy.  He would like to try formal physical therapy again to see if the symptoms can be worked out with the therapist.    I will see him back upon completion of the MRI of the lumbar spine    Return for review of completed images.    Diagnoses and all orders for this visit:    1. Status post lumbar surgery (Primary)  -     MRI Lumbar Spine With & Without Contrast; Future  -     Ambulatory Referral to Physical Therapy Evaluate and treat    2. Lumbar disc herniation with radiculopathy  -     MRI Lumbar Spine With & Without Contrast; Future             Yogesh Moran MD FACS FAANS  Neurological Surgery

## 2023-02-28 ENCOUNTER — OFFICE VISIT (OUTPATIENT)
Dept: NEUROSURGERY | Facility: CLINIC | Age: 43
End: 2023-02-28
Payer: COMMERCIAL

## 2023-02-28 VITALS — DIASTOLIC BLOOD PRESSURE: 82 MMHG | SYSTOLIC BLOOD PRESSURE: 102 MMHG | HEART RATE: 74 BPM | OXYGEN SATURATION: 97 %

## 2023-02-28 DIAGNOSIS — M51.16 LUMBAR DISC HERNIATION WITH RADICULOPATHY: ICD-10-CM

## 2023-02-28 DIAGNOSIS — Z98.890 STATUS POST LUMBAR SURGERY: Primary | ICD-10-CM

## 2023-02-28 PROCEDURE — 99024 POSTOP FOLLOW-UP VISIT: CPT | Performed by: NEUROLOGICAL SURGERY

## 2023-03-22 NOTE — PROGRESS NOTES
"Subjective   History of Present Illness: Enoch Goddard is a 43 y.o. male is here today for follow-up with a new lumbar MRI that was ordered for low back pain that radiates into the left leg with numbness and tingling. Mr. Goddard has undergone a right L5-S1 discectomy on 12-19-22.    Today, MR. Goddard reports intermittent back pain that radiates into the left leg with numbness and tingling.  Today he is not having any left leg symptoms.  He was able to travel 9-hour trip to [place] and back last week without accelerated symptoms.  He reports his symptoms have improved somewhat but overall he still feels like his back is weak.     While in the room and during my examination of the patient I wore a mask and eye protection.  I washed my hands before and after this patient encounter.  The patient was also wearing a mask.    History of Present Illness    Tobacco Use: Low Risk    • Smoking Tobacco Use: Never   • Smokeless Tobacco Use: Never   • Passive Exposure: Not on file        The following portions of the patient's history were reviewed and updated as appropriate: allergies, current medications, past family history, past medical history, past social history, past surgical history and problem list.    Review of Systems    Objective     Vitals:    03/28/23 1032   BP: 112/74   Weight: 74.4 kg (164 lb)   Height: 182.9 cm (72\")     Body mass index is 22.24 kg/m².      Physical Exam  Neurologic Exam    Physical Exam:    CONSTITUTIONAL:  appears well developed, well-nourished and in no acute distress.    NECK: the neck is supple and symmetric. The trachea is midline with no masses.      PULMONARY: Respiratory effort is normal with no increased work of breathing or signs of respiratory distress.    CARDIOVASCULAR: Pedal pulses are +2/4 bilaterally. Examination of the extremities shows no edema or varicosities.    MUSCULOSKELETAL: Gait normal    SKIN: The skin is warm, dry and intact.  Lumbar incision " healed    NEUROLOGIC:   Normal motor strength noted to confrontational testing of both lower extremities. Muscle bulk and tone are normal.  Sensory exam is normal to fine touch both lower extremities  Reflexes still present in the left ankle and absent in the right ankle  Straight leg raising test negative on the right, hamstring tightening on the left with a little pain around the greater trochanter on the left  Cortical function is intact and without deficits. Speech is normal.    PSYCHIATRIC: oriented to person, place and time. Patient's mood and affect are normal.    Assessment & Plan   Independent Review of Radiographic Studies:      I personally reviewed the images from the following studies.    MRI of the lumbar spine done at [facility] on March 13, 2023 reveals interval right hemilaminectomy and partial discectomy with removal of a large L5-S1 central and right paracentral disc herniation.  Mild enhancement of the right epidural small residual disc protrusion without deformity of the thecal sac and no evidence of nerve root impingement.  No spinal stenosis or discogenic changes to the left.    Medical Decision Making:      The MRI is reassuring and I showed him the images that basically although there is still a description by the radiologist of a disc protrusion there is no threat to the neurologic elements at the L5-S1 level on either side.  The extruded fragment of disc has been completely resected and decompressed the canal.  Given that anatomic improvement with the weak sensation he will feels in the back with the occasional left leg aching I do not know how to advise the physical therapist beyond 3 months after surgery.  I am good to refer him to a physical medicine rehab specialist see the if they have any thoughts or ideas about continued rehab and protection for the future.    Return if symptoms worsen or fail to improve.    Diagnoses and all orders for this visit:    1. Status post lumbar  surgery (Primary)  -     Ambulatory Referral to Physical Therapy Evaluate and treat  -     Ambulatory Referral to Physical Medicine Rehab    2. Lumbosacral disc disease  -     Ambulatory Referral to Physical Therapy Evaluate and treat  -     Ambulatory Referral to Physical Medicine Rehab             Yogesh Moran MD FACS FAANS  Neurological Surgery

## 2023-03-28 ENCOUNTER — OFFICE VISIT (OUTPATIENT)
Dept: NEUROSURGERY | Facility: CLINIC | Age: 43
End: 2023-03-28
Payer: COMMERCIAL

## 2023-03-28 VITALS
SYSTOLIC BLOOD PRESSURE: 112 MMHG | WEIGHT: 164 LBS | HEIGHT: 72 IN | DIASTOLIC BLOOD PRESSURE: 74 MMHG | BODY MASS INDEX: 22.21 KG/M2

## 2023-03-28 DIAGNOSIS — Z98.890 STATUS POST LUMBAR SURGERY: Primary | ICD-10-CM

## 2023-03-28 DIAGNOSIS — M51.9 LUMBOSACRAL DISC DISEASE: ICD-10-CM

## 2023-03-28 PROBLEM — M51.16 LUMBAR DISC HERNIATION WITH RADICULOPATHY: Status: RESOLVED | Noted: 2022-11-29 | Resolved: 2023-03-28

## 2023-03-28 PROCEDURE — 99213 OFFICE O/P EST LOW 20 MIN: CPT | Performed by: NEUROLOGICAL SURGERY

## 2023-04-11 ENCOUNTER — TRANSCRIBE ORDERS (OUTPATIENT)
Dept: ADMINISTRATIVE | Facility: HOSPITAL | Age: 43
End: 2023-04-11
Payer: COMMERCIAL

## 2023-04-11 DIAGNOSIS — I42.2 PRIMARY HYPERTROPHIC CARDIOMYOPATHY: Primary | ICD-10-CM

## 2023-04-19 ENCOUNTER — HOSPITAL ENCOUNTER (OUTPATIENT)
Dept: CARDIOLOGY | Facility: HOSPITAL | Age: 43
Discharge: HOME | End: 2023-04-19
Admitting: FAMILY MEDICINE
Payer: COMMERCIAL

## 2023-04-19 VITALS — WEIGHT: 164 LBS | HEIGHT: 72 IN | BODY MASS INDEX: 22.21 KG/M2

## 2023-04-19 DIAGNOSIS — I42.2 PRIMARY HYPERTROPHIC CARDIOMYOPATHY: ICD-10-CM

## 2023-04-19 LAB
AORTIC DIMENSIONLESS INDEX: 0.9 (DI)
ASCENDING AORTA: 2.9 CM
BH CV ECHO MEAS - ACS: 2.24 CM
BH CV ECHO MEAS - AO MAX PG: 4.1 MMHG
BH CV ECHO MEAS - AO MEAN PG: 2.08 MMHG
BH CV ECHO MEAS - AO ROOT DIAM: 3.7 CM
BH CV ECHO MEAS - AO V2 MAX: 101.5 CM/SEC
BH CV ECHO MEAS - AO V2 VTI: 20 CM
BH CV ECHO MEAS - AVA(I,D): 2.41 CM2
BH CV ECHO MEAS - CONTRAST EF 4CH: 61 CM2
BH CV ECHO MEAS - EDV(CUBED): 99 ML
BH CV ECHO MEAS - EDV(MOD-SP2): 110 ML
BH CV ECHO MEAS - EDV(MOD-SP4): 106 ML
BH CV ECHO MEAS - EF(MOD-BP): 61.2 %
BH CV ECHO MEAS - EF(MOD-SP2): 59.1 %
BH CV ECHO MEAS - EF(MOD-SP4): 60.4 %
BH CV ECHO MEAS - ESV(CUBED): 32.8 ML
BH CV ECHO MEAS - ESV(MOD-SP2): 45 ML
BH CV ECHO MEAS - ESV(MOD-SP4): 42 ML
BH CV ECHO MEAS - FS: 30.8 %
BH CV ECHO MEAS - IVS/LVPW: 1.12 CM
BH CV ECHO MEAS - IVSD: 0.9 CM
BH CV ECHO MEAS - LAT PEAK E' VEL: 12.4 CM/SEC
BH CV ECHO MEAS - LV DIASTOLIC VOL/BSA (35-75): 54.1 CM2
BH CV ECHO MEAS - LV MASS(C)D: 129.5 GRAMS
BH CV ECHO MEAS - LV MAX PG: 3.5 MMHG
BH CV ECHO MEAS - LV MEAN PG: 1.45 MMHG
BH CV ECHO MEAS - LV SYSTOLIC VOL/BSA (12-30): 21.4 CM2
BH CV ECHO MEAS - LV V1 MAX: 93 CM/SEC
BH CV ECHO MEAS - LV V1 VTI: 18.5 CM
BH CV ECHO MEAS - LVIDD: 4.6 CM
BH CV ECHO MEAS - LVIDS: 3.2 CM
BH CV ECHO MEAS - LVOT AREA: 2.6 CM2
BH CV ECHO MEAS - LVOT DIAM: 1.82 CM
BH CV ECHO MEAS - LVPWD: 0.81 CM
BH CV ECHO MEAS - MED PEAK E' VEL: 12.6 CM/SEC
BH CV ECHO MEAS - MV A DUR: 0.14 SEC
BH CV ECHO MEAS - MV A MAX VEL: 51.8 CM/SEC
BH CV ECHO MEAS - MV DEC SLOPE: 269 CM/SEC2
BH CV ECHO MEAS - MV DEC TIME: 254 MSEC
BH CV ECHO MEAS - MV E MAX VEL: 71.6 CM/SEC
BH CV ECHO MEAS - MV E/A: 1.38
BH CV ECHO MEAS - MV MAX PG: 2.41 MMHG
BH CV ECHO MEAS - MV MEAN PG: 0.87 MMHG
BH CV ECHO MEAS - MV P1/2T: 90.4 MSEC
BH CV ECHO MEAS - MV V2 VTI: 21.1 CM
BH CV ECHO MEAS - MVA(P1/2T): 2.43 CM2
BH CV ECHO MEAS - MVA(VTI): 2.29 CM2
BH CV ECHO MEAS - PA ACC TIME: 0.14 SEC
BH CV ECHO MEAS - PA PR(ACCEL): 14.8 MMHG
BH CV ECHO MEAS - PA V2 MAX: 89 CM/SEC
BH CV ECHO MEAS - QP/QS: 0.74
BH CV ECHO MEAS - RAP SYSTOLE: 8 MMHG
BH CV ECHO MEAS - RV MAX PG: 2.38 MMHG
BH CV ECHO MEAS - RV V1 MAX: 77.1 CM/SEC
BH CV ECHO MEAS - RV V1 VTI: 19.1 CM
BH CV ECHO MEAS - RVOT DIAM: 1.54 CM
BH CV ECHO MEAS - RVSP: 22.6 MMHG
BH CV ECHO MEAS - SI(MOD-SP2): 33.2 ML/M2
BH CV ECHO MEAS - SI(MOD-SP4): 32.7 ML/M2
BH CV ECHO MEAS - SV(LVOT): 48.1 ML
BH CV ECHO MEAS - SV(MOD-SP2): 65 ML
BH CV ECHO MEAS - SV(MOD-SP4): 64 ML
BH CV ECHO MEAS - SV(RVOT): 35.7 ML
BH CV ECHO MEAS - TAPSE (>1.6): 1.9 CM
BH CV ECHO MEAS - TR MAX PG: 14.6 MMHG
BH CV ECHO MEAS - TR MAX VEL: 191.1 CM/SEC
BH CV ECHO MEASUREMENTS AVERAGE E/E' RATIO: 5.73
BH CV VAS BP RIGHT ARM: NORMAL MMHG
LEFT ATRIUM VOLUME INDEX: 23.4 ML/M2
MAXIMAL PREDICTED HEART RATE: 179 BPM
SINUS: 3.4 CM
STJ: 2.9 CM
STRESS TARGET HR: 152 BPM

## 2023-04-19 PROCEDURE — 93306 TTE W/DOPPLER COMPLETE: CPT

## 2023-04-19 PROCEDURE — 25510000001 PERFLUTREN (DEFINITY) 8.476 MG IN SODIUM CHLORIDE (PF) 0.9 % 10 ML INJECTION: Performed by: FAMILY MEDICINE

## 2023-04-19 PROCEDURE — 93306 TTE W/DOPPLER COMPLETE: CPT | Performed by: INTERNAL MEDICINE

## 2023-04-19 RX ADMIN — SODIUM CHLORIDE 1.5 ML: 9 INJECTION INTRAMUSCULAR; INTRAVENOUS; SUBCUTANEOUS at 10:42

## 2024-01-12 ENCOUNTER — TRANSCRIBE ORDERS (OUTPATIENT)
Dept: LAB | Facility: HOSPITAL | Age: 44
End: 2024-01-12
Payer: COMMERCIAL

## 2024-01-12 ENCOUNTER — HOSPITAL ENCOUNTER (OUTPATIENT)
Dept: GENERAL RADIOLOGY | Facility: HOSPITAL | Age: 44
Discharge: HOME | End: 2024-01-12
Admitting: FAMILY MEDICINE
Payer: COMMERCIAL

## 2024-01-12 DIAGNOSIS — M47.812 OSTEOARTHRITIS OF CERVICAL SPINE, UNSPECIFIED SPINAL OSTEOARTHRITIS COMPLICATION STATUS: ICD-10-CM

## 2024-01-12 DIAGNOSIS — M47.812 OSTEOARTHRITIS OF CERVICAL SPINE, UNSPECIFIED SPINAL OSTEOARTHRITIS COMPLICATION STATUS: Primary | ICD-10-CM

## 2024-01-12 PROCEDURE — 72040 X-RAY EXAM NECK SPINE 2-3 VW: CPT

## 2024-06-20 ENCOUNTER — HOSPITAL ENCOUNTER (EMERGENCY)
Facility: HOSPITAL | Age: 44
Discharge: HOME | End: 2024-06-20
Attending: EMERGENCY MEDICINE
Payer: COMMERCIAL

## 2024-06-20 VITALS
BODY MASS INDEX: 24.22 KG/M2 | DIASTOLIC BLOOD PRESSURE: 78 MMHG | OXYGEN SATURATION: 97 % | RESPIRATION RATE: 18 BRPM | HEIGHT: 73 IN | TEMPERATURE: 98 F | WEIGHT: 182.76 LBS | SYSTOLIC BLOOD PRESSURE: 127 MMHG | HEART RATE: 74 BPM

## 2024-06-20 DIAGNOSIS — R25.1 TREMOR: Primary | ICD-10-CM

## 2024-06-20 LAB
ALBUMIN SERPL-MCNC: 4.6 G/DL (ref 3.5–5.2)
ALBUMIN/GLOB SERPL: 2.2 G/DL
ALP SERPL-CCNC: 61 U/L (ref 39–117)
ALT SERPL W P-5'-P-CCNC: 25 U/L (ref 1–41)
ANION GAP SERPL CALCULATED.3IONS-SCNC: 9 MMOL/L (ref 5–15)
AST SERPL-CCNC: 18 U/L (ref 1–40)
BASOPHILS # BLD AUTO: 0.03 10*3/MM3 (ref 0–0.2)
BASOPHILS NFR BLD AUTO: 0.6 % (ref 0–1.5)
BILIRUB SERPL-MCNC: 0.6 MG/DL (ref 0–1.2)
BILIRUB UR QL STRIP: NEGATIVE
BUN SERPL-MCNC: 15 MG/DL (ref 6–20)
BUN/CREAT SERPL: 15.5 (ref 7–25)
CALCIUM SPEC-SCNC: 9.2 MG/DL (ref 8.6–10.5)
CHLORIDE SERPL-SCNC: 102 MMOL/L (ref 98–107)
CLARITY UR: CLEAR
CO2 SERPL-SCNC: 27 MMOL/L (ref 22–29)
COLOR UR: YELLOW
CREAT SERPL-MCNC: 0.97 MG/DL (ref 0.76–1.27)
DEPRECATED RDW RBC AUTO: 41.6 FL (ref 37–54)
EGFRCR SERPLBLD CKD-EPI 2021: 100 ML/MIN/1.73
EOSINOPHIL # BLD AUTO: 0.06 10*3/MM3 (ref 0–0.4)
EOSINOPHIL NFR BLD AUTO: 1.2 % (ref 0.3–6.2)
ERYTHROCYTE [DISTWIDTH] IN BLOOD BY AUTOMATED COUNT: 12.9 % (ref 12.3–15.4)
GLOBULIN UR ELPH-MCNC: 2.1 GM/DL
GLUCOSE SERPL-MCNC: 136 MG/DL (ref 65–99)
GLUCOSE UR STRIP-MCNC: NEGATIVE MG/DL
HCT VFR BLD AUTO: 45.8 % (ref 37.5–51)
HGB BLD-MCNC: 15.4 G/DL (ref 13–17.7)
HGB UR QL STRIP.AUTO: NEGATIVE
HOLD SPECIMEN: NORMAL
IMM GRANULOCYTES # BLD AUTO: 0.01 10*3/MM3 (ref 0–0.05)
IMM GRANULOCYTES NFR BLD AUTO: 0.2 % (ref 0–0.5)
KETONES UR QL STRIP: NEGATIVE
LEUKOCYTE ESTERASE UR QL STRIP.AUTO: NEGATIVE
LYMPHOCYTES # BLD AUTO: 0.94 10*3/MM3 (ref 0.7–3.1)
LYMPHOCYTES NFR BLD AUTO: 18.4 % (ref 19.6–45.3)
MAGNESIUM SERPL-MCNC: 1.9 MG/DL (ref 1.6–2.6)
MCH RBC QN AUTO: 29.4 PG (ref 26.6–33)
MCHC RBC AUTO-ENTMCNC: 33.6 G/DL (ref 31.5–35.7)
MCV RBC AUTO: 87.6 FL (ref 79–97)
MONOCYTES # BLD AUTO: 0.3 10*3/MM3 (ref 0.1–0.9)
MONOCYTES NFR BLD AUTO: 5.9 % (ref 5–12)
NEUTROPHILS NFR BLD AUTO: 3.77 10*3/MM3 (ref 1.7–7)
NEUTROPHILS NFR BLD AUTO: 73.7 % (ref 42.7–76)
NITRITE UR QL STRIP: NEGATIVE
NRBC BLD AUTO-RTO: 0 /100 WBC (ref 0–0.2)
PH UR STRIP.AUTO: 7 [PH] (ref 5–8)
PLATELET # BLD AUTO: 174 10*3/MM3 (ref 140–450)
PMV BLD AUTO: 11.3 FL (ref 6–12)
POTASSIUM SERPL-SCNC: 4.3 MMOL/L (ref 3.5–5.2)
PROT SERPL-MCNC: 6.7 G/DL (ref 6–8.5)
PROT UR QL STRIP: NEGATIVE
RBC # BLD AUTO: 5.23 10*6/MM3 (ref 4.14–5.8)
SODIUM SERPL-SCNC: 138 MMOL/L (ref 136–145)
SP GR UR STRIP: 1.02 (ref 1–1.03)
UROBILINOGEN UR QL STRIP: NORMAL
WBC NRBC COR # BLD AUTO: 5.11 10*3/MM3 (ref 3.4–10.8)
WHOLE BLOOD HOLD COAG: NORMAL

## 2024-06-20 PROCEDURE — 99283 EMERGENCY DEPT VISIT LOW MDM: CPT

## 2024-06-20 PROCEDURE — 81003 URINALYSIS AUTO W/O SCOPE: CPT | Performed by: EMERGENCY MEDICINE

## 2024-06-20 PROCEDURE — 83735 ASSAY OF MAGNESIUM: CPT | Performed by: EMERGENCY MEDICINE

## 2024-06-20 PROCEDURE — 86618 LYME DISEASE ANTIBODY: CPT | Performed by: EMERGENCY MEDICINE

## 2024-06-20 PROCEDURE — 80053 COMPREHEN METABOLIC PANEL: CPT

## 2024-06-20 PROCEDURE — 85025 COMPLETE CBC W/AUTO DIFF WBC: CPT

## 2024-06-20 PROCEDURE — 87798 DETECT AGENT NOS DNA AMP: CPT | Performed by: EMERGENCY MEDICINE

## 2024-06-20 PROCEDURE — 87468 ANAPLSMA PHGCYTOPHLM AMP PRB: CPT | Performed by: EMERGENCY MEDICINE

## 2024-06-20 PROCEDURE — 36415 COLL VENOUS BLD VENIPUNCTURE: CPT

## 2024-06-20 PROCEDURE — 25810000003 SODIUM CHLORIDE 0.9 % SOLUTION: Performed by: EMERGENCY MEDICINE

## 2024-06-20 PROCEDURE — 87484 EHRLICHA CHAFFEENSIS AMP PRB: CPT | Performed by: EMERGENCY MEDICINE

## 2024-06-20 RX ORDER — DOXYCYCLINE HYCLATE 100 MG/1
100 TABLET, DELAYED RELEASE ORAL 2 TIMES DAILY
Qty: 20 TABLET | Refills: 0 | Status: SHIPPED | OUTPATIENT
Start: 2024-06-20

## 2024-06-20 RX ADMIN — SODIUM CHLORIDE 1000 ML: 9 INJECTION, SOLUTION INTRAVENOUS at 15:34

## 2024-06-20 NOTE — ED PROVIDER NOTES
Time: 3:21 PM EDT  Date of encounter:  6/20/2024  Independent Historian/Clinical History and Information was obtained by:   Patient    History is limited by: N/A    Chief Complaint: Tremors      History of Present Illness:  Patient is a 44 y.o. year old male who presents to the emergency department for evaluation of tremors that been intermittent for the past 2 to 3 weeks.  Patient denies fever and chills.  Patient does report he has had some diarrhea.  Patient denies abdominal pain.  Patient has no chest pain or shortness of breath.  Patient has no cough hemoptysis.  Patient does report that he has had some tick bites.    HPI    Patient Care Team  Primary Care Provider: César Figueroa MD    Past Medical History:     No Known Allergies  Past Medical History:   Diagnosis Date    Abdominal pain     being followed by GI MD    Abnormal ECG 1/1/2018    Acute gastritis without hemorrhage     Atypical chest pain     Back pain     Chest pain due to GERD     IBS (irritable bowel syndrome)     Loose stools     Lumbar herniated disc     L5-S1    Lumbosacral disc disease 7/16/22    Migraine     MRSA (methicillin resistant Staphylococcus aureus) 8/1/2006    MRSA infection 2006    [facility]   abdomin following gallbladder surgery    RLS (restless legs syndrome)      Past Surgical History:   Procedure Laterality Date    BACK SURGERY  12/19/22    CHOLECYSTECTOMY      COLONOSCOPY      ENDOSCOPY      LUMBAR DISCECTOMY Right 12/19/2022    Procedure: Lumbar Microscopic Discectomy, right lumbar 5 sacral 1;  Surgeon: Yogesh Moran MD;  Location: [facility];  Service: Neurosurgery;  Laterality: Right;     Family History   Problem Relation Age of Onset    Sudden death Paternal Aunt     Early death Paternal Aunt         Age 24    Cancer Maternal Grandfather         Stomach cancer    Heart disease Paternal Grandfather 84    Arthritis Sister     Malig Hyperthermia Neg Hx        Home  Medications:  Prior to Admission medications    Medication Sig Start Date End Date Taking? Authorizing Provider   Ascorbic Acid (Vitamin C) 125 MG chewable tablet Chew Daily.    Azalea Snider MD   esomeprazole (nexIUM) 20 MG capsule Take 1 capsule by mouth Every Morning Before Breakfast.    Azalea Snider MD   ibuprofen (ADVIL,MOTRIN) 600 MG tablet Take 1 tablet by mouth Every 6 (Six) Hours As Needed for Mild Pain.    Azalea Snider MD   multivitamin (THERAGRAN) tablet tablet Take 1 tablet by mouth Daily.    Azalea Snider MD   omeprazole (priLOSEC) 40 MG capsule Take 1 capsule by mouth At Night As Needed.    Azalea Snider MD   Turmeric 500 MG capsule Take 2 capsules by mouth 2 (Two) Times a Day.    Azalea Snider MD   vitamin D3 125 MCG (5000 UT) capsule capsule Take 1 capsule by mouth Daily.    Azalea Snider MD        Social History:   Social History     Tobacco Use    Smoking status: Never    Smokeless tobacco: Never    Tobacco comments:     caff use   Vaping Use    Vaping status: Never Used   Substance Use Topics    Alcohol use: Not Currently     Comment: 1 monthly    Drug use: Yes     Frequency: 1.0 times per week     Types: Marijuana     Comment: last 2 nights ago         Review of Systems:  Review of Systems   Constitutional:  Negative for chills and fever.   HENT:  Negative for congestion, rhinorrhea and sore throat.    Eyes:  Negative for pain and visual disturbance.   Respiratory:  Negative for apnea, cough, chest tightness and shortness of breath.    Cardiovascular:  Negative for chest pain and palpitations.   Gastrointestinal:  Negative for abdominal pain, diarrhea, nausea and vomiting.   Genitourinary:  Negative for difficulty urinating and dysuria.   Musculoskeletal:  Negative for joint swelling and myalgias.   Skin:  Negative for color change.   Neurological:  Positive for tremors. Negative for seizures and headaches.   Psychiatric/Behavioral:  "Negative.     All other systems reviewed and are negative.       Physical Exam:  /77   Pulse 78   Temp 98 °F (36.7 °C) (Oral)   Resp 18   Ht 185.4 cm (73\")   Wt 82.9 kg (182 lb 12.2 oz)   SpO2 98%   BMI 24.11 kg/m²     Physical Exam  Vitals and nursing note reviewed.   Constitutional:       General: He is not in acute distress.     Appearance: Normal appearance. He is not toxic-appearing.   HENT:      Head: Normocephalic and atraumatic.      Jaw: There is normal jaw occlusion.   Eyes:      General: Lids are normal.      Extraocular Movements: Extraocular movements intact.      Conjunctiva/sclera: Conjunctivae normal.      Pupils: Pupils are equal, round, and reactive to light.   Cardiovascular:      Rate and Rhythm: Normal rate and regular rhythm.      Pulses: Normal pulses.      Heart sounds: Normal heart sounds.   Pulmonary:      Effort: Pulmonary effort is normal. No respiratory distress.      Breath sounds: Normal breath sounds. No wheezing or rhonchi.   Abdominal:      General: Abdomen is flat.      Palpations: Abdomen is soft.      Tenderness: There is no abdominal tenderness. There is no guarding or rebound.   Musculoskeletal:         General: Normal range of motion.      Cervical back: Normal range of motion and neck supple.      Right lower leg: No edema.      Left lower leg: No edema.   Skin:     General: Skin is warm and dry.   Neurological:      Mental Status: He is alert and oriented to person, place, and time. Mental status is at baseline.   Psychiatric:         Mood and Affect: Mood normal.                  Procedures:  Procedures      Medical Decision Making:      Comorbidities that affect care:    None    External Notes reviewed:    Previous Clinic Note: Patient seen in clinic for migraine.      The following orders were placed and all results were independently analyzed by me:  Orders Placed This Encounter   Procedures    Comprehensive Metabolic Panel    CBC Auto Differential    " Urinalysis With Microscopic If Indicated (No Culture) - Urine, Clean Catch    Magnesium    Ehrlichia Profile DNA PCR    Rickettsia Species DNA, Real-Time PCR    Lyme Disease Total Antibody With Reflex to Immunoassay    CBC & Differential    Extra Tubes    Gold Top - SST    Light Blue Top       Medications Given in the Emergency Department:  Medications   sodium chloride 0.9 % bolus 1,000 mL (1,000 mL Intravenous New Bag 6/20/24 1534)        ED Course:         Labs:    Lab Results (last 24 hours)       Procedure Component Value Units Date/Time    CBC & Differential [[id number]]  (Abnormal) Collected: 06/20/24 1328    Specimen: Blood Updated: 06/20/24 1335    Narrative:      The following orders were created for panel order CBC & Differential.  Procedure                               Abnormality         Status                     ---------                               -----------         ------                     CBC Auto Differential[[id number]]        Abnormal            Final result                 Please view results for these tests on the individual orders.    CBC Auto Differential [[id number]]  (Abnormal) Collected: 06/20/24 1328    Specimen: Blood Updated: 06/20/24 1335     WBC 5.11 10*3/mm3      RBC 5.23 10*6/mm3      Hemoglobin 15.4 g/dL      Hematocrit 45.8 %      MCV 87.6 fL      MCH 29.4 pg      MCHC 33.6 g/dL      RDW 12.9 %      RDW-SD 41.6 fl      MPV 11.3 fL      Platelets 174 10*3/mm3      Neutrophil % 73.7 %      Lymphocyte % 18.4 %      Monocyte % 5.9 %      Eosinophil % 1.2 %      Basophil % 0.6 %      Immature Grans % 0.2 %      Neutrophils, Absolute 3.77 10*3/mm3      Lymphocytes, Absolute 0.94 10*3/mm3      Monocytes, Absolute 0.30 10*3/mm3      Eosinophils, Absolute 0.06 10*3/mm3      Basophils, Absolute 0.03 10*3/mm3      Immature Grans, Absolute 0.01 10*3/mm3      nRBC 0.0 /100 WBC     Comprehensive Metabolic Panel [[id number]]  (Abnormal) Collected: 06/20/24 1329    Specimen: Blood Updated:  06/20/24 1400     Glucose 136 mg/dL      BUN 15 mg/dL      Creatinine 0.97 mg/dL      Sodium 138 mmol/L      Potassium 4.3 mmol/L      Comment: Slight hemolysis detected by analyzer. Result may be falsely elevated.        Chloride 102 mmol/L      CO2 27.0 mmol/L      Calcium 9.2 mg/dL      Total Protein 6.7 g/dL      Albumin 4.6 g/dL      ALT (SGPT) 25 U/L      AST (SGOT) 18 U/L      Alkaline Phosphatase 61 U/L      Total Bilirubin 0.6 mg/dL      Globulin 2.1 gm/dL      A/G Ratio 2.2 g/dL      BUN/Creatinine Ratio 15.5     Anion Gap 9.0 mmol/L      eGFR 100.0 mL/min/1.73     Narrative:      GFR Normal >60  Chronic Kidney Disease <60  Kidney Failure <15      Magnesium [[id number]]  (Normal) Collected: 06/20/24 1329    Specimen: Blood Updated: 06/20/24 1545     Magnesium 1.9 mg/dL     Ehrlichia Profile DNA PCR [[id number]] Collected: 06/20/24 1534    Specimen: Blood Updated: 06/20/24 1541    Rickettsia Species DNA, Real-Time PCR [[id number]] Collected: 06/20/24 1534    Specimen: Blood Updated: 06/20/24 1540    Lyme Disease Total Antibody With Reflex to Immunoassay [[id number]] Collected: 06/20/24 1534    Specimen: Blood Updated: 06/20/24 1541    Urinalysis With Microscopic If Indicated (No Culture) - Urine, Clean Catch [[id number]]  (Normal) Collected: 06/20/24 1602    Specimen: Urine, Clean Catch Updated: 06/20/24 1606     Color, UA Yellow     Appearance, UA Clear     pH, UA 7.0     Specific Gravity, UA 1.021     Glucose, UA Negative     Ketones, UA Negative     Bilirubin, UA Negative     Blood, UA Negative     Protein, UA Negative     Leuk Esterase, UA Negative     Nitrite, UA Negative     Urobilinogen, UA 0.2 E.U./dL    Narrative:      Urine microscopic not indicated.             Imaging:    No Radiology Exams Resulted Within Past 24 Hours      Differential Diagnosis and Discussion:    Weakness: Based on the patient's history, signs, and symptoms, the diffential diagnosis includes but is not limited to meningitis,  stroke, sepsis, subarachnoid hemorrhage, intracranial bleeding, encephalitis, acute uti, dehydration, MS, myasthenia gravis, Guillan Ovett, migraine variant, neuromuscular disorders vertigo, electrolyte imbalance, and metabolic disorders.    All labs were reviewed and interpreted by me.    MDM     The patient´s CBC that was reviewed and interpreted by me shows no abnormalities of critical concern. Of note, there is no anemia requiring a blood transfusion and the platelet count is acceptable.  The patient´s CMP that was reviewed and interpretted by me shows no abnormalities of critical concern. Of note, the patient´s sodium and potassium are acceptable. The patient´s liver enzymes are unremarkable. The patient´s renal function (creatinine) is preserved. The patient has a normal anion gap.  Tick titers are pending, however the patient will be started on doxycycline.  Patient was advised to follow-up with neurology in the next 2 to 3 days.            Patient Care Considerations:    MRI: I considered ordering an MRI however patient is able to walk and has had no fecal or urinary incontinence.      Consultants/Shared Management Plan:    None    Social Determinants of Health:    Patient is independent, reliable, and has access to care.       Disposition and Care Coordination:    Discharged: I considered escalation of care by admitting this patient to the hospital, however patient will follow-up with a neurologist and his PCP.    I have explained the patient´s condition, diagnoses and treatment plan based on the information available to me at this time. I have answered questions and addressed any concerns. The patient has a good  understanding of the patient´s diagnosis, condition, and treatment plan as can be expected at this point. The vital signs have been stable. The patient´s condition is stable and appropriate for discharge from the emergency department.      The patient will pursue further outpatient evaluation with  the primary care physician or other designated or consulting physician as outlined in the discharge instructions. They are agreeable to this plan of care and follow-up instructions have been explained in detail. The patient has received these instructions in written format and has expressed an understanding of the discharge instructions. The patient is aware that any significant change in condition or worsening of symptoms should prompt an immediate return to this or the closest emergency department or call to 911.  I have explained discharge medications and the need for follow up with the patient/caretakers. This was also printed in the discharge instructions. Patient was discharged with the following medications and follow up:      Medication List        New Prescriptions      doxycycline 100 MG enteric coated tablet  Commonly known as: DORYX  Take 1 tablet by mouth 2 (Two) Times a Day.               Where to Get Your Medications        These medications were sent to [facility] [employer] [employer] [facility] - [place], [place] - [address] [facility]. - [phone] [facility] [phone] [address], [facility] [address]      Phone: [phone]   doxycycline 100 MG enteric coated tablet      OropillEzequiel flores MD  [address] [place] [address]  [phone]    In 2 days         Final diagnoses:   Tremor        ED Disposition       ED Disposition   Discharge    Condition   Stable    Comment   --               This medical record created using voice recognition software.             Roosevelt Hilliard MD  06/20/24 6999

## 2024-06-21 LAB — B BURGDOR IGG+IGM SER QL IA: NEGATIVE

## 2024-06-23 LAB — RICKETTSIA RICKETTSII DNA, RT: NOT DETECTED

## 2024-06-25 LAB
A PHAGOCYTOPH DNA BLD QL NAA+PROBE: NEGATIVE
E CHAFFEENSIS DNA BLD QL NAA+PROBE: NEGATIVE

## 2024-07-17 ENCOUNTER — TRANSCRIBE ORDERS (OUTPATIENT)
Dept: ADMINISTRATIVE | Facility: HOSPITAL | Age: 44
End: 2024-07-17
Payer: COMMERCIAL

## 2024-07-17 DIAGNOSIS — M54.50 LUMBAR BACK PAIN: Primary | ICD-10-CM

## 2024-07-23 ENCOUNTER — HOSPITAL ENCOUNTER (OUTPATIENT)
Dept: GENERAL RADIOLOGY | Facility: HOSPITAL | Age: 44
Discharge: HOME | End: 2024-07-23
Payer: COMMERCIAL

## 2024-07-23 DIAGNOSIS — M54.50 LUMBAR BACK PAIN: ICD-10-CM

## 2024-07-23 PROCEDURE — 72100 X-RAY EXAM L-S SPINE 2/3 VWS: CPT

## 2024-08-07 ENCOUNTER — TRANSCRIBE ORDERS (OUTPATIENT)
Dept: ADMINISTRATIVE | Facility: HOSPITAL | Age: 44
End: 2024-08-07
Payer: COMMERCIAL

## 2024-08-07 DIAGNOSIS — M54.50 LUMBAR BACK PAIN: Primary | ICD-10-CM

## 2024-08-16 ENCOUNTER — HOSPITAL ENCOUNTER (OUTPATIENT)
Dept: OTHER | Facility: HOSPITAL | Age: 44
Discharge: HOME | End: 2024-08-16

## 2024-09-08 NOTE — PROGRESS NOTES
Chief Complaint     Abdominal Pain, Dizziness, Nausea, and Chest Pain    History of Present Illness     Enoch Goddard is a 44 y.o. male who presents to [facility] on referral from JOSÉ ANTONIO Rivero for a gastroenterology evaluation of Abdominal discomfort.      Patient has struggled with gastrointestinal trouble over the past decade.  He states it began years ago with chest and arm pain. Patient was evaluted in the ED and states his heart was noted to be fine. It was determined to be GI related and was placed on different trials of PPIs with no improvement. Reports that three months ago he started having tremors in his hands and feet,light headedness, as well as trouble with his vision.     Over the last week noticed increased nausea and dizziness.Denies vomiting, unintentional weight loss, or dysphagia. No reports of hematemesis.    He reports his typical bowel regimen consists of formed stool and then followed by watery bowel movements. Denies mucus, oily, bloody, or black stool. No abdominal pain noted.    Previous EGD/Colonoscopy 6/10/2021 at [facility] Dr Braun possible sliding hiatal hernia and esophagitis. Duodenitis.   Esophageal manometry was normal     History      Past Medical History:   Diagnosis Date    Abdominal pain     being followed by GI MD    Abnormal ECG 1/1/2018    Acute gastritis without hemorrhage     Atypical chest pain     Back pain     Chest pain due to GERD     IBS (irritable bowel syndrome)     Loose stools     Lumbar herniated disc     L5-S1    Lumbosacral disc disease 7/16/22    Migraine     MRSA (methicillin resistant Staphylococcus aureus) 8/1/2006    MRSA infection 2006    [facility]   abdomin following gallbladder surgery    RLS (restless legs syndrome)        Past Surgical History:   Procedure Laterality Date    BACK SURGERY  12/19/22    CHOLECYSTECTOMY      COLONOSCOPY      ENDOSCOPY      LUMBAR DISCECTOMY Right  12/19/2022    Procedure: Lumbar Microscopic Discectomy, right lumbar 5 sacral 1;  Surgeon: Yogesh Moran MD;  Location: [facility] MAIN OR;  Service: Neurosurgery;  Laterality: Right;       Family History   Problem Relation Age of Onset    Arthritis Sister     Sudden death Paternal Aunt     Early death Paternal Aunt         Age 24    Cancer Maternal Grandfather         Stomach cancer    Heart disease Paternal Grandfather 84    Malig Hyperthermia Neg Hx     Colon cancer Neg Hx         Current Medications        Current Outpatient Medications:     Ascorbic Acid (Vitamin C) 125 MG chewable tablet, Chew Daily., Disp: , Rfl:     EPINEPHrine (EPIPEN) 0.3 MG/0.3ML solution auto-injector injection, , Disp: , Rfl:     multivitamin (THERAGRAN) tablet tablet, Take 1 tablet by mouth Daily., Disp: , Rfl:     Turmeric 500 MG capsule, Take 2 capsules by mouth 2 (Two) Times a Day., Disp: , Rfl:     vitamin D3 125 MCG (5000 UT) capsule capsule, Take 1 capsule by mouth Daily., Disp: , Rfl:     cholestyramine light 4 g powder, Take 1 packet by mouth 2 (Two) Times a Day. mixed with a liquid, Disp: 378 g, Rfl: 3    sucralfate (CARAFATE) 1 g tablet, Take 1 tablet by mouth 4 (Four) Times a Day for 30 days., Disp: 120 tablet, Rfl: 0     Allergies     No Known Allergies    Social History       Social History     Social History Narrative    Not on file       Immunizations     Immunization:  Immunization History   Administered Date(s) Administered    COVID-19 (MODERNA) 1st,2nd,3rd Dose Monovalent 07/27/2021, 08/25/2021    Hep B, Adolescent or Pediatric 11/19/1999, 01/18/2000, 05/24/2000    Td (TDVAX) 03/14/1997          Objective     Objective     Vital Signs:   /60 (BP Location: Left arm, Patient Position: Sitting, Cuff Size: Adult)   Pulse 70   Wt 76.3 kg (168 lb 3.2 oz)   SpO2 99%   BMI 22.19 kg/m²       Physical Exam  HENT:      Head: Normocephalic.      Nose: Nose normal.   Eyes:      Pupils: Pupils are equal, round, and  "reactive to light.   Cardiovascular:      Rate and Rhythm: Normal rate.   Pulmonary:      Effort: Pulmonary effort is normal.   Abdominal:      General: Bowel sounds are normal.   Musculoskeletal:         General: Normal range of motion.   Neurological:      General: No focal deficit present.      Mental Status: He is alert.   Psychiatric:         Mood and Affect: Mood normal.         Results      Result Review :   The following data was reviewed by: JOSÉ ANTONIO Wan on 09/09/2024:    7/10/2024 [facility]:  HIV panel- non-reactive  Hep C Antibody- non-reactive  Vitamin D, 25-Hydroxy- 140  Vitamin B12- 479  Folate 11.3    7/10/2024  IgG 820  IgA 139    6/25/2024  Deamindated Gliadin Abs 6  T-transglutaminase <2    Lab Results - Last 18 Months   Lab Units 06/20/24  1328   WBC 10*3/mm3 5.11   HEMOGLOBIN g/dL 15.4   MCV fL 87.6   PLATELETS 10*3/mm3 174         Lab Results - Last 18 Months   Lab Units 06/20/24  1329   BUN mg/dL 15   CREATININE mg/dL 0.97   SODIUM mmol/L 138   POTASSIUM mmol/L 4.3   CHLORIDE mmol/L 102   CO2 mmol/L 27.0   GLUCOSE mg/dL 136*      No results for input(s): \"PROTIME\", \"INR\", \"PTT\" in the last 73187 hours.  Lab Results - Last 18 Months   Lab Units 06/20/24  1329   AST (SGOT) U/L 18   ALT (SGPT) U/L 25   ALK PHOS U/L 61   BILIRUBIN mg/dL 0.6   TOTAL PROTEIN g/dL 6.7   ALBUMIN g/dL 4.6      No results for input(s): \"IRON\", \"TRANSFERRIN\", \"TIBC\", \"FERRITIN\", \"[id number]\", \"FOLATE\" in the last 29403 hours.  No results for input(s): \"HAV\", \"HEPAIGM\", \"HEPBIGM\", \"HEPBCAB\", \"HBEAG\", \"HEPCAB\" in the last 40382 hours.    MRI lumbar with without 8/16/2024:  Spondylotic changes at L4-L5 and L5-S1.    XR Spine Lumbar 2 or 3 View    Result Date: 7/24/2024  Impression: Mild L5-S1 degenerative disc disease. No acute osseous abnormality of the lumbar spine. Electronically Signed: Francesca Tyler MD  7/24/2024 1:33 PM EDT  Workstation ID: [id number]         Assessment and Plan "        Assessment and Plan    Diagnoses and all orders for this visit:    1. Gastroesophageal reflux disease, unspecified whether esophagitis present (Primary)  -     sucralfate (CARAFATE) 1 g tablet; Take 1 tablet by mouth 4 (Four) Times a Day for 30 days.  Dispense: 120 tablet; Refill: 0    2. Diarrhea, unspecified type  -     cholestyramine light 4 g powder; Take 1 packet by mouth 2 (Two) Times a Day. mixed with a liquid  Dispense: 378 g; Refill: 3    3. Epigastric pain  -     sucralfate (CARAFATE) 1 g tablet; Take 1 tablet by mouth 4 (Four) Times a Day for 30 days.  Dispense: 120 tablet; Refill: 0        * Surgery not found *      Follow Up        Follow Up   Return in about 3 months (around 12/9/2024), or if symptoms worsen or fail to improve.    After an in-depth discussion with the patient it is determined he would prefer to control his heartburn with the help of diet changes and GERD prevention exercises.  Patient is very aware of the foods that cause increased heartburn and does his best to avoid these foods.  We discussed the FODMAP diet and keeping a food diary.  Through our discussion the patient did note that he has tried Carafate in the past and feels it did help his reflux.  Will start the patient on Carafate to see if it helps with his symptoms.  Patient is interested in trying a bile acid sequestrant for his loose stool that he has once a day.  I will do a 30-day trial of the bile acid sequestrant and advised patient to call office or send a [employer] message with feedback concerning the medications.  Patient is aware that Chest pain can also be cardiac in nature. If pain radiates to jaw left shoulder or arm be sure to seek medical attention immediately.  I also explained that trouble with vision and lightheadedness may need to be evaluated in the ED to rule out stroke.  Patient displays understanding and agrees to follow-up in the ED if symptoms progress.      Patient was given instructions and  counseling regarding his condition or for health maintenance advice. Please see specific information pulled into the AVS if appropriate.

## 2024-09-09 ENCOUNTER — OFFICE VISIT (OUTPATIENT)
Dept: GASTROENTEROLOGY | Facility: CLINIC | Age: 44
End: 2024-09-09
Payer: COMMERCIAL

## 2024-09-09 VITALS
BODY MASS INDEX: 22.19 KG/M2 | WEIGHT: 168.2 LBS | HEART RATE: 70 BPM | OXYGEN SATURATION: 99 % | DIASTOLIC BLOOD PRESSURE: 60 MMHG | SYSTOLIC BLOOD PRESSURE: 112 MMHG

## 2024-09-09 DIAGNOSIS — R19.7 DIARRHEA, UNSPECIFIED TYPE: ICD-10-CM

## 2024-09-09 DIAGNOSIS — R10.13 EPIGASTRIC PAIN: ICD-10-CM

## 2024-09-09 DIAGNOSIS — K21.9 GASTROESOPHAGEAL REFLUX DISEASE, UNSPECIFIED WHETHER ESOPHAGITIS PRESENT: Primary | ICD-10-CM

## 2024-09-09 RX ORDER — EPINEPHRINE 0.3 MG/.3ML
INJECTION SUBCUTANEOUS
COMMUNITY
Start: 2024-07-02

## 2024-09-09 RX ORDER — SUCRALFATE 1 G/1
1 TABLET ORAL 4 TIMES DAILY
Qty: 120 TABLET | Refills: 0 | Status: SHIPPED | OUTPATIENT
Start: 2024-09-09 | End: 2024-10-09

## 2024-09-12 ENCOUNTER — PATIENT ROUNDING (BHMG ONLY) (OUTPATIENT)
Dept: GASTROENTEROLOGY | Facility: CLINIC | Age: 44
End: 2024-09-12
Payer: COMMERCIAL

## 2024-09-12 NOTE — PROGRESS NOTES
"9/12/2024      Hello, may I speak with Enoch Goddard     My name is Ian. I am calling from [facility]. I show that you had a recent visit with JOSÉ ANTONIO Wan.    Before we get started may I verify your date of birth? 1980    I am calling to officially welcome you to our practice and ask about your recent visit. Is this a good time to talk? Yes    Tell me about your visit with us. What things went well? \"Chanel was awesome! She really took the time and listened to me.\"     We strive to ensure that we protect your safety and privacy. Is there anything we could have done to improve this during your visit?    No    We're always looking for ways to make our patients' experiences even better. Do you have recommendations on ways we may improve? No    Overall were you satisfied with your first visit to our practice? Yes    I appreciate you taking the time to speak with me today. Is there anything else I can do for you? I discussed with patient the importance of picking up his medications. Pt states he is still experiencing some GERD and nausea. Pt states he will  rx and is aware to contact the office if symptoms are not better with rx.     I am glad to hear that you had a very good visit and I appreciate you taking the time to provide feedback on this call. We would greatly appreciate you filling out a survey if you receive one in the mail, email or text. This is a great opportunity to provide any additional feedback that you may think of after this call as well.       Thank you, and have a great day.    "

## 2024-09-17 ENCOUNTER — TELEPHONE (OUTPATIENT)
Dept: NEUROSURGERY | Facility: CLINIC | Age: 44
End: 2024-09-17
Payer: COMMERCIAL

## 2024-09-18 ENCOUNTER — OFFICE VISIT (OUTPATIENT)
Dept: NEUROSURGERY | Facility: CLINIC | Age: 44
End: 2024-09-18
Payer: COMMERCIAL

## 2024-09-18 ENCOUNTER — TELEPHONE (OUTPATIENT)
Dept: GASTROENTEROLOGY | Facility: CLINIC | Age: 44
End: 2024-09-18
Payer: COMMERCIAL

## 2024-09-18 ENCOUNTER — TELEPHONE (OUTPATIENT)
Dept: NEUROSURGERY | Facility: CLINIC | Age: 44
End: 2024-09-18

## 2024-09-18 VITALS
BODY MASS INDEX: 22 KG/M2 | HEIGHT: 73 IN | DIASTOLIC BLOOD PRESSURE: 74 MMHG | WEIGHT: 166 LBS | SYSTOLIC BLOOD PRESSURE: 121 MMHG | HEART RATE: 74 BPM

## 2024-09-18 DIAGNOSIS — R20.0 BILATERAL NUMBNESS AND TINGLING OF ARMS AND LEGS: ICD-10-CM

## 2024-09-18 DIAGNOSIS — M48.061 FORAMINAL STENOSIS OF LUMBAR REGION: ICD-10-CM

## 2024-09-18 DIAGNOSIS — M54.81 OCCIPITAL NEURALGIA OF RIGHT SIDE: ICD-10-CM

## 2024-09-18 DIAGNOSIS — G89.29 CHRONIC LEFT-SIDED LOW BACK PAIN WITH LEFT-SIDED SCIATICA: ICD-10-CM

## 2024-09-18 DIAGNOSIS — R29.898 WEAKNESS OF BOTH HANDS: ICD-10-CM

## 2024-09-18 DIAGNOSIS — M47.816 FACET ARTHROPATHY, LUMBAR: ICD-10-CM

## 2024-09-18 DIAGNOSIS — R19.7 DIARRHEA, UNSPECIFIED TYPE: Primary | ICD-10-CM

## 2024-09-18 DIAGNOSIS — M51.27 HERNIATED NUCLEUS PULPOSUS, L5-S1: Primary | ICD-10-CM

## 2024-09-18 DIAGNOSIS — M54.42 CHRONIC LEFT-SIDED LOW BACK PAIN WITH LEFT-SIDED SCIATICA: ICD-10-CM

## 2024-09-18 DIAGNOSIS — M54.2 CERVICALGIA: ICD-10-CM

## 2024-09-18 DIAGNOSIS — R20.2 BILATERAL NUMBNESS AND TINGLING OF ARMS AND LEGS: ICD-10-CM

## 2024-09-18 DIAGNOSIS — R26.9 GAIT DISTURBANCE: ICD-10-CM

## 2024-09-18 PROCEDURE — 99214 OFFICE O/P EST MOD 30 MIN: CPT | Performed by: PHYSICIAN ASSISTANT

## 2024-09-19 RX ORDER — MONTELUKAST SODIUM 4 MG/1
1-2 TABLET, CHEWABLE ORAL 2 TIMES DAILY
Qty: 120 TABLET | Refills: 1 | Status: SHIPPED | OUTPATIENT
Start: 2024-09-19

## 2024-09-26 ENCOUNTER — HOSPITAL ENCOUNTER (OUTPATIENT)
Dept: OTHER | Facility: HOSPITAL | Age: 44
Discharge: HOME | End: 2024-09-26

## 2024-09-27 DIAGNOSIS — R20.0 BILATERAL NUMBNESS AND TINGLING OF ARMS AND LEGS: ICD-10-CM

## 2024-09-27 DIAGNOSIS — R20.2 BILATERAL NUMBNESS AND TINGLING OF ARMS AND LEGS: ICD-10-CM

## 2024-09-27 DIAGNOSIS — R26.9 GAIT DISTURBANCE: ICD-10-CM

## 2024-09-27 DIAGNOSIS — M54.2 CERVICALGIA: ICD-10-CM

## 2024-10-06 ENCOUNTER — APPOINTMENT (OUTPATIENT)
Dept: GENERAL RADIOLOGY | Facility: HOSPITAL | Age: 44
End: 2024-10-06
Payer: COMMERCIAL

## 2024-10-06 ENCOUNTER — HOSPITAL ENCOUNTER (EMERGENCY)
Facility: HOSPITAL | Age: 44
Discharge: HOME | End: 2024-10-06
Attending: EMERGENCY MEDICINE | Admitting: EMERGENCY MEDICINE
Payer: COMMERCIAL

## 2024-10-06 VITALS
TEMPERATURE: 98.1 F | WEIGHT: 168.65 LBS | RESPIRATION RATE: 16 BRPM | OXYGEN SATURATION: 100 % | HEIGHT: 73 IN | BODY MASS INDEX: 22.35 KG/M2 | HEART RATE: 66 BPM | DIASTOLIC BLOOD PRESSURE: 96 MMHG | SYSTOLIC BLOOD PRESSURE: 131 MMHG

## 2024-10-06 DIAGNOSIS — R07.9 CHEST PAIN, UNSPECIFIED TYPE: ICD-10-CM

## 2024-10-06 DIAGNOSIS — F41.1 ANXIETY STATE: ICD-10-CM

## 2024-10-06 DIAGNOSIS — R00.2 PALPITATION: Primary | ICD-10-CM

## 2024-10-06 LAB
ALBUMIN SERPL-MCNC: 4.6 G/DL (ref 3.5–5.2)
ALBUMIN/GLOB SERPL: 1.8 G/DL
ALP SERPL-CCNC: 66 U/L (ref 39–117)
ALT SERPL W P-5'-P-CCNC: 17 U/L (ref 1–41)
ANION GAP SERPL CALCULATED.3IONS-SCNC: 10.2 MMOL/L (ref 5–15)
AST SERPL-CCNC: 17 U/L (ref 1–40)
BASOPHILS # BLD AUTO: 0.03 10*3/MM3 (ref 0–0.2)
BASOPHILS NFR BLD AUTO: 0.6 % (ref 0–1.5)
BILIRUB SERPL-MCNC: 0.6 MG/DL (ref 0–1.2)
BUN SERPL-MCNC: 13 MG/DL (ref 6–20)
BUN/CREAT SERPL: 12.5 (ref 7–25)
CALCIUM SPEC-SCNC: 9.5 MG/DL (ref 8.6–10.5)
CHLORIDE SERPL-SCNC: 101 MMOL/L (ref 98–107)
CO2 SERPL-SCNC: 27.8 MMOL/L (ref 22–29)
CREAT SERPL-MCNC: 1.04 MG/DL (ref 0.76–1.27)
DEPRECATED RDW RBC AUTO: 40.1 FL (ref 37–54)
EGFRCR SERPLBLD CKD-EPI 2021: 91.9 ML/MIN/1.73
EOSINOPHIL # BLD AUTO: 0.1 10*3/MM3 (ref 0–0.4)
EOSINOPHIL NFR BLD AUTO: 1.8 % (ref 0.3–6.2)
ERYTHROCYTE [DISTWIDTH] IN BLOOD BY AUTOMATED COUNT: 12.4 % (ref 12.3–15.4)
GLOBULIN UR ELPH-MCNC: 2.6 GM/DL
GLUCOSE SERPL-MCNC: 87 MG/DL (ref 65–99)
HCT VFR BLD AUTO: 47.8 % (ref 37.5–51)
HGB BLD-MCNC: 15.8 G/DL (ref 13–17.7)
HOLD SPECIMEN: NORMAL
HOLD SPECIMEN: NORMAL
IMM GRANULOCYTES # BLD AUTO: 0.01 10*3/MM3 (ref 0–0.05)
IMM GRANULOCYTES NFR BLD AUTO: 0.2 % (ref 0–0.5)
LIPASE SERPL-CCNC: 46 U/L (ref 13–60)
LYMPHOCYTES # BLD AUTO: 1.56 10*3/MM3 (ref 0.7–3.1)
LYMPHOCYTES NFR BLD AUTO: 28.8 % (ref 19.6–45.3)
MAGNESIUM SERPL-MCNC: 2.3 MG/DL (ref 1.6–2.6)
MCH RBC QN AUTO: 29.2 PG (ref 26.6–33)
MCHC RBC AUTO-ENTMCNC: 33.1 G/DL (ref 31.5–35.7)
MCV RBC AUTO: 88.4 FL (ref 79–97)
MONOCYTES # BLD AUTO: 0.37 10*3/MM3 (ref 0.1–0.9)
MONOCYTES NFR BLD AUTO: 6.8 % (ref 5–12)
NEUTROPHILS NFR BLD AUTO: 3.34 10*3/MM3 (ref 1.7–7)
NEUTROPHILS NFR BLD AUTO: 61.8 % (ref 42.7–76)
NRBC BLD AUTO-RTO: 0 /100 WBC (ref 0–0.2)
NT-PROBNP SERPL-MCNC: <36 PG/ML (ref 0–450)
PLATELET # BLD AUTO: 175 10*3/MM3 (ref 140–450)
PMV BLD AUTO: 10.9 FL (ref 6–12)
POTASSIUM SERPL-SCNC: 4.8 MMOL/L (ref 3.5–5.2)
PROT SERPL-MCNC: 7.2 G/DL (ref 6–8.5)
RBC # BLD AUTO: 5.41 10*6/MM3 (ref 4.14–5.8)
SODIUM SERPL-SCNC: 139 MMOL/L (ref 136–145)
TROPONIN T SERPL HS-MCNC: <6 NG/L
WBC NRBC COR # BLD AUTO: 5.41 10*3/MM3 (ref 3.4–10.8)
WHOLE BLOOD HOLD COAG: NORMAL
WHOLE BLOOD HOLD SPECIMEN: NORMAL

## 2024-10-06 PROCEDURE — 80053 COMPREHEN METABOLIC PANEL: CPT

## 2024-10-06 PROCEDURE — 83735 ASSAY OF MAGNESIUM: CPT

## 2024-10-06 PROCEDURE — 93005 ELECTROCARDIOGRAM TRACING: CPT | Performed by: EMERGENCY MEDICINE

## 2024-10-06 PROCEDURE — 93005 ELECTROCARDIOGRAM TRACING: CPT

## 2024-10-06 PROCEDURE — 36415 COLL VENOUS BLD VENIPUNCTURE: CPT

## 2024-10-06 PROCEDURE — 99284 EMERGENCY DEPT VISIT MOD MDM: CPT

## 2024-10-06 PROCEDURE — 85025 COMPLETE CBC W/AUTO DIFF WBC: CPT

## 2024-10-06 PROCEDURE — 83690 ASSAY OF LIPASE: CPT

## 2024-10-06 PROCEDURE — 71045 X-RAY EXAM CHEST 1 VIEW: CPT

## 2024-10-06 PROCEDURE — 83880 ASSAY OF NATRIURETIC PEPTIDE: CPT

## 2024-10-06 PROCEDURE — 84484 ASSAY OF TROPONIN QUANT: CPT

## 2024-10-06 RX ORDER — SODIUM CHLORIDE 0.9 % (FLUSH) 0.9 %
10 SYRINGE (ML) INJECTION AS NEEDED
Status: DISCONTINUED | OUTPATIENT
Start: 2024-10-06 | End: 2024-10-07 | Stop reason: HOSPADM

## 2024-10-06 RX ORDER — MAGNESIUM OXIDE 400 MG/1
400 TABLET ORAL DAILY
COMMUNITY

## 2024-10-06 RX ORDER — HYDROXYZINE HYDROCHLORIDE 10 MG/5ML
4 SYRUP ORAL EVERY 6 HOURS PRN
COMMUNITY

## 2024-10-06 RX ORDER — ASPIRIN 81 MG/1
324 TABLET, CHEWABLE ORAL ONCE
Status: COMPLETED | OUTPATIENT
Start: 2024-10-06 | End: 2024-10-06

## 2024-10-06 RX ADMIN — ASPIRIN 324 MG: 81 TABLET, CHEWABLE ORAL at 20:22

## 2024-10-07 NOTE — ED PROVIDER NOTES
Time: 9:10 PM EDT  Date of encounter:  10/6/2024  Independent Historian/Clinical History and Information was obtained by:   Patient    History is limited by: N/A    Chief Complaint: Chest pain      History of Present Illness:  Patient is a 44 y.o. year old male who presents to the emergency department for evaluation of chest pain    Patient describes a sudden event yesterday morning that awoke him from sleep was as though a intense shocking sensation in his left anterior chest.  Since that time he has had persistent pressure-like discomfort in the same area.  He has not had the same sensation any additional times.  But it has been associated with a persistent headache and some mild lightheadedness.  Patient describes a history of GERD that has been successfully treated with diet and intermittent Maalox but he states that this episode was different.      Patient Care Team  Primary Care Provider: Michela Ramey APRN    Past Medical History:     No Known Allergies  Past Medical History:   Diagnosis Date    Abdominal pain     being followed by GI MD    Abnormal ECG 1/1/2018    Acute gastritis without hemorrhage     Atypical chest pain     Back pain     Chest pain due to GERD     IBS (irritable bowel syndrome)     Loose stools     Lumbar herniated disc     L5-S1    Lumbosacral disc disease 7/16/22    Migraine     MRSA (methicillin resistant Staphylococcus aureus) 8/1/2006    MRSA infection 2006    [facility] in [facility]   abdomin following gallbladder surgery    RLS (restless legs syndrome)      Past Surgical History:   Procedure Laterality Date    BACK SURGERY  12/19/22    CHOLECYSTECTOMY      COLONOSCOPY      ENDOSCOPY      LUMBAR DISCECTOMY Right 12/19/2022    Procedure: Lumbar Microscopic Discectomy, right lumbar 5 sacral 1;  Surgeon: Yogesh Moran MD;  Location: [facility];  Service: Neurosurgery;  Laterality: Right;     Family History   Problem Relation Age of Onset    Arthritis Sister      Sudden death Paternal Aunt     Early death Paternal Aunt         Age 24    Cancer Maternal Grandfather         Stomach cancer    Heart disease Paternal Grandfather 84    Malig Hyperthermia Neg Hx     Colon cancer Neg Hx        Home Medications:  Prior to Admission medications    Medication Sig Start Date End Date Taking? Authorizing Provider   Ascorbic Acid (Vitamin C) 125 MG chewable tablet Chew Daily.   Yes Azalea Snider MD   multivitamin (THERAGRAN) tablet tablet Take 1 tablet by mouth Daily.   Yes Azalea Snider MD   vitamin D3 125 MCG (5000 UT) capsule capsule Take 1 capsule by mouth Daily.   Yes Azalea Snider MD   chlorpheniramine (CHLOR-TRIMETON) 4 MG tablet Take 1 tablet by mouth Every 6 (Six) Hours As Needed for Allergies.    Azalea Snider MD   colestipol (COLESTID) 1 g tablet Take 1-2 tablets by mouth 2 (Two) Times a Day. 9/19/24   Chanel Nguyen APRN   EPINEPHrine (EPIPEN) 0.3 MG/0.3ML solution auto-injector injection  7/2/24   Azalea Snider MD   magnesium oxide (MAG-OX) 400 MG tablet Take 1 tablet by mouth Daily.    Azalea Snider MD   sucralfate (CARAFATE) 1 g tablet Take 1 tablet by mouth 4 (Four) Times a Day for 30 days. 9/9/24 10/9/24  Chanel Nguyen APRN   Turmeric 500 MG capsule Take 2 capsules by mouth 2 (Two) Times a Day.    Azalea Snider MD        Social History:   Social History     Tobacco Use    Smoking status: Never    Smokeless tobacco: Never    Tobacco comments:     caff use   Vaping Use    Vaping status: Never Used   Substance Use Topics    Alcohol use: Not Currently     Comment: 1 monthly    Drug use: Yes     Frequency: 3.0 times per week     Types: Marijuana     Comment: last 2 nights ago         Review of Systems:  Review of Systems   Constitutional:  Negative for chills and fever.   HENT:  Negative for congestion, ear pain and sore throat.    Eyes:  Negative for pain.   Respiratory:  Negative for cough, chest  "tightness and shortness of breath.    Cardiovascular:  Positive for chest pain.   Gastrointestinal:  Negative for abdominal pain, diarrhea, nausea and vomiting.   Genitourinary:  Negative for flank pain and hematuria.   Musculoskeletal:  Negative for joint swelling.   Skin:  Negative for pallor.   Neurological:  Positive for headaches. Negative for seizures.   All other systems reviewed and are negative.       Physical Exam:  /96 (BP Location: Right arm, Patient Position: Sitting)   Pulse 66   Temp 98.1 °F (36.7 °C) (Oral)   Resp 16   Ht 185.4 cm (73\")   Wt 76.5 kg (168 lb 10.4 oz)   SpO2 100%   BMI 22.25 kg/m²     Physical Exam  Vitals and nursing note reviewed.   Constitutional:       General: He is not in acute distress.     Appearance: Normal appearance. He is not toxic-appearing.   HENT:      Head: Normocephalic and atraumatic.      Jaw: There is normal jaw occlusion.   Eyes:      General: Lids are normal.      Extraocular Movements: Extraocular movements intact.      Conjunctiva/sclera: Conjunctivae normal.      Pupils: Pupils are equal, round, and reactive to light.   Cardiovascular:      Rate and Rhythm: Normal rate and regular rhythm.      Pulses: Normal pulses.      Heart sounds: Normal heart sounds.   Pulmonary:      Effort: Pulmonary effort is normal. No respiratory distress.      Breath sounds: Normal breath sounds. No wheezing or rhonchi.   Abdominal:      General: Abdomen is flat.      Palpations: Abdomen is soft.      Tenderness: There is no abdominal tenderness. There is no guarding or rebound.   Musculoskeletal:         General: Normal range of motion.      Cervical back: Normal range of motion and neck supple.      Right lower leg: No edema.      Left lower leg: No edema.   Skin:     General: Skin is warm and dry.   Neurological:      Mental Status: He is alert and oriented to person, place, and time. Mental status is at baseline.   Psychiatric:         Mood and Affect: Mood normal. "           Procedures:  Procedures      Medical Decision Making:      Comorbidities that affect care:    Gastritis, migraine, IBS, herniated disc, RLS, history of abnormal ECG    External Notes reviewed:    Previous Clinic Note: Outpatient neurosurgery visit for herniated nucleus pulposus to L5-S1 9/18/2024      The following orders were placed and all results were independently analyzed by me:  Orders Placed This Encounter   Procedures    XR Chest 1 View    Nuevo Draw    High Sensitivity Troponin T    Comprehensive Metabolic Panel    Lipase    BNP    Magnesium    CBC Auto Differential    Ambulatory Referral to Cardiology    Continuous Pulse Oximetry    ECG 12 Lead ED Triage Standing Order; Chest Pain    CBC & Differential    Green Top (Gel)    Lavender Top    Gold Top - SST    Light Blue Top       Medications Given in the Emergency Department:  Medications   aspirin chewable tablet 324 mg (324 mg Oral Given 10/6/24 2022)        ED Course:    ED Course as of 10/07/24 0643   Sun Oct 06, 2024   2111 My interpretation of ECG at 1856, sinus rhythm 62, no acute ischemia [JS]      ED Course User Index  [JS] Ezequiel Mercado MD       Labs:    Lab Results (last 24 hours)       Procedure Component Value Units Date/Time    High Sensitivity Troponin T [[id number]]  (Normal) Collected: 10/06/24 1902    Specimen: Blood from Arm, Right Updated: 10/06/24 1929     HS Troponin T <6 ng/L     Narrative:      High Sensitive Troponin T Reference Range:  <14.0 ng/L- Negative Female for AMI  <22.0 ng/L- Negative Male for AMI  >=14 - Abnormal Female indicating possible myocardial injury.  >=22 - Abnormal Male indicating possible myocardial injury.   Clinicians would have to utilize clinical acumen, EKG, Troponin, and serial changes to determine if it is an Acute Myocardial Infarction or myocardial injury due to an underlying chronic condition.         CBC & Differential [[id number]]  (Normal) Collected: 10/06/24 1902    Specimen: Blood from  Arm, Right Updated: 10/06/24 1908    Narrative:      The following orders were created for panel order CBC & Differential.  Procedure                               Abnormality         Status                     ---------                               -----------         ------                     CBC Auto Differential[[id number]]        Normal              Final result                 Please view results for these tests on the individual orders.    Comprehensive Metabolic Panel [[id number]] Collected: 10/06/24 1902    Specimen: Blood from Arm, Right Updated: 10/06/24 1929     Glucose 87 mg/dL      BUN 13 mg/dL      Creatinine 1.04 mg/dL      Sodium 139 mmol/L      Potassium 4.8 mmol/L      Chloride 101 mmol/L      CO2 27.8 mmol/L      Calcium 9.5 mg/dL      Total Protein 7.2 g/dL      Albumin 4.6 g/dL      ALT (SGPT) 17 U/L      AST (SGOT) 17 U/L      Alkaline Phosphatase 66 U/L      Total Bilirubin 0.6 mg/dL      Globulin 2.6 gm/dL      A/G Ratio 1.8 g/dL      BUN/Creatinine Ratio 12.5     Anion Gap 10.2 mmol/L      eGFR 91.9 mL/min/1.73     Narrative:      GFR Normal >60  Chronic Kidney Disease <60  Kidney Failure <15      Lipase [[id number]]  (Normal) Collected: 10/06/24 1902    Specimen: Blood from Arm, Right Updated: 10/06/24 1929     Lipase 46 U/L     BNP [[id number]]  (Normal) Collected: 10/06/24 1902    Specimen: Blood from Arm, Right Updated: 10/06/24 1926     proBNP <36.0 pg/mL     Narrative:      This assay is used as an aid in the diagnosis of individuals suspected of having heart failure. It can be used as an aid in the diagnosis of acute decompensated heart failure (ADHF) in patients presenting with signs and symptoms of ADHF to the emergency department (ED). In addition, NT-proBNP of <300 pg/mL indicates ADHF is not likely.    Age Range Result Interpretation  NT-proBNP Concentration (pg/mL:      <50             Positive            >450                   Gray                 300-450                     Negative             <300    50-75           Positive            >900                  Gray                300-900                  Negative            <300      >75             Positive            >1800                  Gray                300-1800                  Negative            <300    Magnesium [[id number]]  (Normal) Collected: 10/06/24 1902    Specimen: Blood from Arm, Right Updated: 10/06/24 1929     Magnesium 2.3 mg/dL     CBC Auto Differential [[id number]]  (Normal) Collected: 10/06/24 1902    Specimen: Blood from Arm, Right Updated: 10/06/24 1908     WBC 5.41 10*3/mm3      RBC 5.41 10*6/mm3      Hemoglobin 15.8 g/dL      Hematocrit 47.8 %      MCV 88.4 fL      MCH 29.2 pg      MCHC 33.1 g/dL      RDW 12.4 %      RDW-SD 40.1 fl      MPV 10.9 fL      Platelets 175 10*3/mm3      Neutrophil % 61.8 %      Lymphocyte % 28.8 %      Monocyte % 6.8 %      Eosinophil % 1.8 %      Basophil % 0.6 %      Immature Grans % 0.2 %      Neutrophils, Absolute 3.34 10*3/mm3      Lymphocytes, Absolute 1.56 10*3/mm3      Monocytes, Absolute 0.37 10*3/mm3      Eosinophils, Absolute 0.10 10*3/mm3      Basophils, Absolute 0.03 10*3/mm3      Immature Grans, Absolute 0.01 10*3/mm3      nRBC 0.0 /100 WBC              Imaging:    XR Chest 1 View    Result Date: 10/6/2024  XR CHEST 1 VW Date of exam: 10/6/2024, 7:00 P.M., EDT. Indications: Chest pain, especially left-sided (beginning last night); SOA/SOB/shortness of air/shortness of breath; lightheadedness. Comparison: 9/17/2019. FINDINGS: A single AP (or PA) upright portable chest radiograph was performed. No cardiac enlargement is seen. No acute infiltrate is appreciated. No pleural effusion or pneumothorax is identified. No significant interval change is seen since the prior study (or studies).      No acute infiltrate is appreciated.    Portions of this note were completed with a voice recognition program.  Electronically Signed: Paulie Cifuentes MD  10/6/2024 7:12 PM EDT   Workstation ID: [id number]       Differential Diagnosis and Discussion:    Chest Pain:  Based on the patient's signs and symptoms, I considered aortic dissection, myocardial infaction, pulmonary embolism, cardiac tamponade, pericarditis, pneumothorax, musculoskeletal chest pain and other differential diagnosis as an etiology of the patient's chest pain.     All labs were reviewed and interpreted by me.  All X-rays impressions were independently interpreted by me.  EKG was interpreted by me.    [facility]               Patient Care Considerations:    NARCOTICS: I considered prescribing opiate pain medication as an outpatient, however no pain control required in the ED.      Consultants/Shared Management Plan:    None    Social Determinants of Health:    Patient is independent, reliable, and has access to care.       Disposition and Care Coordination:    Discharged: The patient is suitable and stable for discharge with no need for consideration of admission.    I have explained the patient´s condition, diagnoses and treatment plan based on the information available to me at this time. I have answered questions and addressed any concerns. The patient has a good  understanding of the patient´s diagnosis, condition, and treatment plan as can be expected at this point. The vital signs have been stable. The patient´s condition is stable and appropriate for discharge from the emergency department.      The patient will pursue further outpatient evaluation with the primary care physician or other designated or consulting physician as outlined in the discharge instructions. They are agreeable to this plan of care and follow-up instructions have been explained in detail. The patient has received these instructions in written format and has expressed an understanding of the discharge instructions. The patient is aware that any significant change in condition or worsening of symptoms should prompt an immediate return to this or the  closest emergency department or call to 911.  I have explained discharge medications and the need for follow up with the patient/caretakers. This was also printed in the discharge instructions. Patient was discharged with the following medications and follow up:      Medication List      No changes were made to your prescriptions during this visit.      Michela Ramey, APRN  [address]  [place] [address]  [phone]    Schedule an appointment as soon as possible for a visit          Final diagnoses:   Palpitation   Chest pain, unspecified type   Anxiety state        ED Disposition       ED Disposition   Discharge    Condition   Stable    Comment   --               This medical record created using voice recognition software.             Ezequiel Mercado MD  10/07/24 1209

## 2024-10-09 LAB
QT INTERVAL: 400 MS
QTC INTERVAL: 413 MS

## 2024-10-13 LAB
QT INTERVAL: 387 MS
QTC INTERVAL: 393 MS

## 2024-10-16 ENCOUNTER — OFFICE VISIT (OUTPATIENT)
Dept: NEUROSURGERY | Facility: CLINIC | Age: 44
End: 2024-10-16
Payer: COMMERCIAL

## 2024-10-16 VITALS
DIASTOLIC BLOOD PRESSURE: 62 MMHG | HEIGHT: 73 IN | WEIGHT: 165.5 LBS | SYSTOLIC BLOOD PRESSURE: 112 MMHG | OXYGEN SATURATION: 100 % | HEART RATE: 71 BPM | BODY MASS INDEX: 21.93 KG/M2

## 2024-10-16 DIAGNOSIS — R20.2 BILATERAL NUMBNESS AND TINGLING OF ARMS AND LEGS: ICD-10-CM

## 2024-10-16 DIAGNOSIS — M54.2 CERVICALGIA: Primary | ICD-10-CM

## 2024-10-16 DIAGNOSIS — M50.30 DDD (DEGENERATIVE DISC DISEASE), CERVICAL: ICD-10-CM

## 2024-10-16 DIAGNOSIS — M48.02 FORAMINAL STENOSIS OF CERVICAL REGION: ICD-10-CM

## 2024-10-16 DIAGNOSIS — R20.0 NUMBNESS OF FACE: ICD-10-CM

## 2024-10-16 DIAGNOSIS — R26.9 GAIT DISTURBANCE: ICD-10-CM

## 2024-10-16 DIAGNOSIS — R20.0 BILATERAL NUMBNESS AND TINGLING OF ARMS AND LEGS: ICD-10-CM

## 2024-10-16 RX ORDER — PANTOPRAZOLE SODIUM 40 MG/1
40 TABLET, DELAYED RELEASE ORAL DAILY
COMMUNITY
Start: 2024-10-11

## 2024-10-16 RX ORDER — SUCRALFATE 1 G/1
1 TABLET ORAL 4 TIMES DAILY
COMMUNITY
Start: 2024-10-14

## 2024-10-16 NOTE — PROGRESS NOTES
Patient being seen for today for Neck Pain (Follow up )  .    Subjective    Enoch Goddard is a 44 y.o. male that presents with Neck Pain (Follow up )  .    HPI  Previously: Last seen on 9/18/2024 for low back pain with left leg pain.  There is no surgical intervention based on the imaging.  He also complained of neck pain and numbness in the arms.  There was an order for MRI of the cervical spine without contrast to evaluate further.    Today: He has pain in the neck. Sometimes he feels pain down the arms. Sometimes just in the wrist. Numbness and tingling in the arms.     reports that he has never smoked. He has never used smokeless tobacco.    Review of Systems   Musculoskeletal:  Positive for neck pain.       Objective   Vitals:    10/16/24 1101   BP: 112/62   Pulse: 71   SpO2: 100%        Physical Exam  Constitutional:       Appearance: Normal appearance. He is normal weight.   Pulmonary:      Effort: Pulmonary effort is normal.   Musculoskeletal:         General: Tenderness (midline cervical area) present.      Cervical back: Normal range of motion.      Comments: Hernández's negative bilaterally   Neurological:      General: No focal deficit present.      Mental Status: He is alert and oriented to person, place, and time.      Sensory: No sensory deficit.      Motor: No weakness.      Deep Tendon Reflexes: Reflexes normal.   Psychiatric:         Mood and Affect: Mood normal.         Behavior: Behavior normal.          Result Review   I have personally interpreted the MRI of cervical spine without contrast from 8/16/2024 which shows minimal retrolisthesis of C4 on C5 and C5 on C6. Per the radiologist, there is moderate to severe bilateral foraminal narrowing at C4-C5, moderate severe right and moderate left foraminal narrowing at C5-C6.  There is also moderate left foraminal narrowing at C6-C7 and bilaterally at C3-C4. I would call the narrowing worse on the left at C4-C5, but this is moderate at best in my  opinion.     Assessment and Plan {CC Problem List  Visit Diagnosis  ROS  Review (Popup)  [facility]  Quality  BestPractice  Medications  SmartSets  SnapShot Encounters  Media :23}   Diagnoses and all orders for this visit:    1. Cervicalgia (Primary)    2. DDD (degenerative disc disease), cervical    3. Foraminal stenosis of cervical region    4. Bilateral numbness and tingling of arms and legs  -     Ambulatory Referral to Neurology    5. Gait disturbance  -     Ambulatory Referral to Neurology    6. Numbness of face  -     Ambulatory Referral to Neurology    There is not severe stenosis in the cervical spine that I expect surgery to respond to.    He may consider PT vs spinal injections in the cervical spine.    I do recommend he continue to follow-up with the Neurologist to discuss his nerve complaints in the head and face, as I do not expect the spinal changes to account for this.    He would like to have a second opinion as far as neurology goes (previously seen by Kade) and I will refer him to Dr. Hussein in Neurology for consult on his neurologic complaints, primarily numbness and tingling in the right head and face.    The patient was counseled on basic recommendations for the reduction and prevention of back, neck, or spine pain in association with spinal disorders, including: cessation/avoidance of nicotine use, maintenance of a healthy BMI and weight, focusing on building/maintaining core strength through core exercise, and avoidance of activities which worsen the pain. The patient will monitor for changes in symptoms and notify our clinic of these changes as needed.    He will f/u here PRN.  Follow Up {Instructions Charge Capture  Follow-up Communications :23}   Return if symptoms worsen or fail to improve.

## 2024-10-22 ENCOUNTER — OFFICE VISIT (OUTPATIENT)
Dept: GASTROENTEROLOGY | Facility: CLINIC | Age: 44
End: 2024-10-22
Payer: COMMERCIAL

## 2024-10-22 ENCOUNTER — TELEPHONE (OUTPATIENT)
Dept: GASTROENTEROLOGY | Facility: CLINIC | Age: 44
End: 2024-10-22

## 2024-10-22 VITALS
HEART RATE: 62 BPM | WEIGHT: 166 LBS | BODY MASS INDEX: 21.9 KG/M2 | SYSTOLIC BLOOD PRESSURE: 121 MMHG | DIASTOLIC BLOOD PRESSURE: 74 MMHG | OXYGEN SATURATION: 99 %

## 2024-10-22 DIAGNOSIS — Z79.899 CURRENT USE OF PROTON PUMP INHIBITOR: ICD-10-CM

## 2024-10-22 DIAGNOSIS — R19.5 LOOSE STOOLS: ICD-10-CM

## 2024-10-22 DIAGNOSIS — K21.9 GASTROESOPHAGEAL REFLUX DISEASE, UNSPECIFIED WHETHER ESOPHAGITIS PRESENT: Primary | ICD-10-CM

## 2024-10-22 PROCEDURE — 99214 OFFICE O/P EST MOD 30 MIN: CPT

## 2024-10-22 RX ORDER — PANTOPRAZOLE SODIUM 40 MG/1
40 TABLET, DELAYED RELEASE ORAL 2 TIMES DAILY
Qty: 60 TABLET | Refills: 0 | Status: SHIPPED | OUTPATIENT
Start: 2024-10-22

## 2024-10-22 NOTE — TELEPHONE ENCOUNTER
10/22/2024    Dear Dr Calderon,      Patient Name: Enoch Goddard  : 1980      This patient is waiting to have a Colonoscopy and/or Esophagogastroduodenoscopy which I will perform at [facility] on 24. Please respond to this request noting your recommendations regarding clearance from a Cardiac  standpoint.  You may contact our office at [phone] Option 2 with any questions. I appreciate your prompt response in this matter. Please return this form to our office as soon as possible to [phone].    ____ I approve my patient from a Cardiac  standpoint    ____ I do NOT approve my patient from a Cardiac  standpoint at this time    Please inform our office if the patient requires additional follow-up from your office prior to scheduled procedure date.      Please specify clearance expiration date:____________________________________      Approving physician name (please print): _____________________________________________      Approving physician signature: ________________________________ Date:________________  Sincerely,  [facility] - Gastroenterology   Dr. Reece    Please fill out after pt appt on Friday. Thank You        Please fax approval or denial to our office as soon as possible.

## 2024-10-22 NOTE — PROGRESS NOTES
"Chief Complaint     Follow-up and Heartburn (Pt states his chest hurts)    History of Present Illness     Enoch Goddard is a 44 y.o. male who presents to [facility] for follow-up of abdominal discomfort.    History of present illness:  Patient established care on 9/9/2024 for abdominal discomfort. He reports a decade long of gastrointestinal trouble.  He states it began years ago with chest and arm pain. Patient was evaluted in the ED and states his heart was noted to be fine. It was determined to be GI related and was placed on different trials of PPIs with no improvement. Reports that three months ago he started having tremors in his hands and feet,light headedness, as well as trouble with his vision.      Over the last week noticed increased nausea and dizziness.Denies vomiting, unintentional weight loss, or dysphagia. No reports of hematemesis. He reports his typical bowel regimen consists of formed stool and then followed by watery bowel movements. Denies mucus, oily, bloody, or black stool. No abdominal pain noted. Previous EGD/Colonoscopy 6/10/2021 at [facility] Braun possible sliding hiatal hernia and esophagitis. Duodenitis. Esophageal manometry was normal. start the patient on Carafate to see if it helps with his symptoms.  Patient is interested in trying a bile acid sequestrant for his loose stool that he has once a day.  Advised a month trial of the bile acid sequestrant and advised patient to call office or send a [employer] message with feedback concerning the medications. FODMAP and GERD prevention exercises recommended.    10/22/2024 Office Visit: Patient explains that the evening before he went to the ED, he had lasagna for dinner. He awaken from his sleep with chest pain he described as \"a shock to his chest\". While at the ED, and EKG was performed and determined to be borderline. A referral was placed for Cardiology and Gastroenterology at discharge and was " started on Esomeprazole 20 mg twice a day. He was then seen by his PCP who started him on Protonix and Carafate. He has been on the Protonix for a week and a half. He continues to complain of chest pain, located at the center of his chest, radiating to his back. He describes the pain as a constant searing pressure. He states the pain is worse a few hours after eating. Patient reports decreased pain since starting the Carafate and Protonix. Patient started taking supplements named Tumeric and Boswellia. Patient has had the pain over 2 weeks and states it feels different than in June 2021 when he had esophagitis and duodenitis.     Reports 1 BM a day, stool described as loose and brown in color. No blood or black stool. He has an appointment with a Cardiologist on friday. EKG findings of sinus rhythm and left atrial enlargement.       History      Past Medical History:   Diagnosis Date    Abdominal pain     being followed by GI MD    Abnormal ECG 1/1/2018    Acute gastritis without hemorrhage     Atypical chest pain     Back pain     Chest pain due to GERD     IBS (irritable bowel syndrome)     Loose stools     Lumbar herniated disc     L5-S1    Lumbosacral disc disease 7/16/22    Migraine     MRSA (methicillin resistant Staphylococcus aureus) 8/1/2006    MRSA infection 2006    [facility] in [facility] [address]   abdomin following gallbladder surgery    RLS (restless legs syndrome)      Past Surgical History:   Procedure Laterality Date    BACK SURGERY  12/19/22    CHOLECYSTECTOMY      COLONOSCOPY      ENDOSCOPY      LUMBAR DISCECTOMY Right 12/19/2022    Procedure: Lumbar Microscopic Discectomy, right lumbar 5 sacral 1;  Surgeon: Yogesh Moran MD;  Location: [facility];  Service: Neurosurgery;  Laterality: Right;     Family History   Problem Relation Age of Onset    Arthritis Sister     Sudden death Paternal Aunt     Early death Paternal Aunt         Age 24    Cancer Maternal Grandfather          Stomach cancer    Heart disease Paternal Grandfather 84    Malig Hyperthermia Neg Hx     Colon cancer Neg Hx         Current Medications       Current Outpatient Medications:     Ascorbic Acid (Vitamin C) 125 MG chewable tablet, Chew Daily., Disp: , Rfl:     chlorpheniramine (CHLOR-TRIMETON) 4 MG tablet, Take 1 tablet by mouth Every 6 (Six) Hours As Needed for Allergies., Disp: , Rfl:     EPINEPHrine (EPIPEN) 0.3 MG/0.3ML solution auto-injector injection, , Disp: , Rfl:     magnesium oxide (MAG-OX) 400 MG tablet, Take 1 tablet by mouth Daily., Disp: , Rfl:     multivitamin (THERAGRAN) tablet tablet, Take 1 tablet by mouth Daily., Disp: , Rfl:     sucralfate (CARAFATE) 1 g tablet, Take 1 tablet by mouth 4 (Four) Times a Day., Disp: , Rfl:     Turmeric 500 MG capsule, Take 2 capsules by mouth 2 (Two) Times a Day., Disp: , Rfl:     vitamin D3 125 MCG (5000 UT) capsule capsule, Take 1 capsule by mouth Daily., Disp: , Rfl:     colestipol (COLESTID) 1 g tablet, Take 1-2 tablets by mouth 2 (Two) Times a Day. (Patient not taking: Reported on 10/22/2024), Disp: 120 tablet, Rfl: 1    pantoprazole (PROTONIX) 40 MG EC tablet, Take 1 tablet by mouth 2 (Two) Times a Day., Disp: 60 tablet, Rfl: 0     Allergies     No Known Allergies    Social History       Social History     Social History Narrative    Not on file         Objective       /74 (BP Location: Left arm, Patient Position: Sitting, Cuff Size: Adult)   Pulse 62   Wt 75.3 kg (166 lb)   SpO2 99%   BMI 21.90 kg/m²       Physical Exam  HENT:      Head: Normocephalic.   Eyes:      Pupils: Pupils are equal, round, and reactive to light.   Cardiovascular:      Rate and Rhythm: Normal rate.   Pulmonary:      Effort: Pulmonary effort is normal.   Abdominal:      General: Bowel sounds are normal.   Musculoskeletal:         General: Normal range of motion.   Neurological:      General: No focal deficit present.      Mental Status: He is alert.   Psychiatric:         Mood  "and Affect: Mood normal.         Results       Result Review :    The following data was reviewed by: JOSÉ ANTONIO Wan on 10/22/2024:    Lab Results - Last 18 Months   Lab Units 10/06/24  1902 06/20/24  1328   WBC 10*3/mm3 5.41 5.11   HEMOGLOBIN g/dL 15.8 15.4   MCV fL 88.4 87.6   PLATELETS 10*3/mm3 175 174         Lab Results - Last 18 Months   Lab Units 10/06/24  1902 06/20/24  1329   BUN mg/dL 13 15   CREATININE mg/dL 1.04 0.97   SODIUM mmol/L 139 138   POTASSIUM mmol/L 4.8 4.3   CHLORIDE mmol/L 101 102   CO2 mmol/L 27.8 27.0   GLUCOSE mg/dL 87 136*      No results for input(s): \"PROTIME\", \"INR\", \"PTT\" in the last 32092 hours.  Lab Results - Last 18 Months   Lab Units 10/06/24  1902 06/20/24  1329   AST (SGOT) U/L 17 18   ALT (SGPT) U/L 17 25   ALK PHOS U/L 66 61   BILIRUBIN mg/dL 0.6 0.6   TOTAL PROTEIN g/dL 7.2 6.7   ALBUMIN g/dL 4.6 4.6      No results for input(s): \"IRON\", \"TRANSFERRIN\", \"TIBC\", \"FERRITIN\", \"XVRIGPUO38\", \"FOLATE\" in the last 59553 hours.  No results for input(s): \"HAV\", \"HEPAIGM\", \"HEPBIGM\", \"HEPBCAB\", \"HBEAG\", \"HEPCAB\" in the last 39778 hours.    XR Chest 1 View    Result Date: 10/6/2024  No acute infiltrate is appreciated.    Portions of this note were completed with a voice recognition program.  Electronically Signed: Paulie Cifuentes MD  10/6/2024 7:12 PM EDT  Workstation ID: [id number]    XR Spine Lumbar 2 or 3 View    Result Date: 7/24/2024  Impression: Mild L5-S1 degenerative disc disease. No acute osseous abnormality of the lumbar spine. Electronically Signed: Francesca Tyler MD  7/24/2024 1:33 PM EDT  Workstation ID: [id number]           Assessment and Plan              Diagnoses and all orders for this visit:    1. Gastroesophageal reflux disease, unspecified whether esophagitis present (Primary)  -     Case Request; Standing  -     Case Request  -     pantoprazole (PROTONIX) 40 MG EC tablet; Take 1 tablet by mouth 2 (Two) Times a Day.  Dispense: 60 tablet; Refill: 0    2. " Current use of proton pump inhibitor  -     Case Request; Standing  -     Case Request    Other orders  -     Follow Anesthesia Guidelines / Protocol; Standing  -     Follow Anesthesia Guidelines / Protocol; Future  -     Verify NPO; Standing          ESOPHAGOGASTRODUODENOSCOPY; A flexible tube that has a camera and light at the end will pass through the oral cavity into the esophagus (food tube), stomach and upper part of the small bowel.    Follow Up     Follow Up   No follow-ups on file.    EGD ordered due to GERD despite PPI use. I have recommended that the patient undergo further evaluation with an EGD.  I have discussed this procedure in detail with the patient.  I have discussed the risks, benefits, and alternatives.  I have discussed the risk of anesthesia, bleeding and perforation.  Patient understands these risks, benefits, and alternatives and wishes to proceed.  I will schedule him at his earliest convenience. Increased Protonix 40 mg to twice a day.  Continue Carafate 1gm, prior to eating. Stool studied ordered to rule out infectious or inflammatory causes of the diarrhea.    Patient was given instructions and counseling regarding his condition or for health maintenance advice. Please see specific information pulled into the AVS if appropriate.

## 2024-10-22 NOTE — H&P (VIEW-ONLY)
"Chief Complaint     Follow-up and Heartburn (Pt states his chest hurts)    History of Present Illness     Enoch Goddard is a 44 y.o. male who presents to [facility] for follow-up of abdominal discomfort.    History of present illness:  Patient established care on 9/9/2024 for abdominal discomfort. He reports a decade long of gastrointestinal trouble.  He states it began years ago with chest and arm pain. Patient was evaluted in the ED and states his heart was noted to be fine. It was determined to be GI related and was placed on different trials of PPIs with no improvement. Reports that three months ago he started having tremors in his hands and feet,light headedness, as well as trouble with his vision.      Over the last week noticed increased nausea and dizziness.Denies vomiting, unintentional weight loss, or dysphagia. No reports of hematemesis. He reports his typical bowel regimen consists of formed stool and then followed by watery bowel movements. Denies mucus, oily, bloody, or black stool. No abdominal pain noted. Previous EGD/Colonoscopy 6/10/2021 at [facility] Dr Braun possible sliding hiatal hernia and esophagitis. Duodenitis. Esophageal manometry was normal. start the patient on Carafate to see if it helps with his symptoms.  Patient is interested in trying a bile acid sequestrant for his loose stool that he has once a day.  Advised a month trial of the bile acid sequestrant and advised patient to call office or send a [employer] message with feedback concerning the medications. FODMAP and GERD prevention exercises recommended.    10/22/2024 Office Visit: Patient explains that the evening before he went to the ED, he had lasagna for dinner. He awaken from his sleep with chest pain he described as \"a shock to his chest\". While at the ED, and EKG was performed and determined to be borderline. A referral was placed for Cardiology and Gastroenterology at discharge and was " started on Esomeprazole 20 mg twice a day. He was then seen by his PCP who started him on Protonix and Carafate. He has been on the Protonix for a week and a half. He continues to complain of chest pain, located at the center of his chest, radiating to his back. He describes the pain as a constant searing pressure. He states the pain is worse a few hours after eating. Patient reports decreased pain since starting the Carafate and Protonix. Patient started taking supplements named Tumeric and Boswellia. Patient has had the pain over 2 weeks and states it feels different than in June 2021 when he had esophagitis and duodenitis.     Reports 1 BM a day, stool described as loose and brown in color. No blood or black stool. He has an appointment with a Cardiologist on friday. EKG findings of sinus rhythm and left atrial enlargement.       History      Past Medical History:   Diagnosis Date    Abdominal pain     being followed by GI MD    Abnormal ECG 1/1/2018    Acute gastritis without hemorrhage     Atypical chest pain     Back pain     Chest pain due to GERD     IBS (irritable bowel syndrome)     Loose stools     Lumbar herniated disc     L5-S1    Lumbosacral disc disease 7/16/22    Migraine     MRSA (methicillin resistant Staphylococcus aureus) 8/1/2006    MRSA infection 2006    [facility] in [facility]   abdomin following gallbladder surgery    RLS (restless legs syndrome)      Past Surgical History:   Procedure Laterality Date    BACK SURGERY  12/19/22    CHOLECYSTECTOMY      COLONOSCOPY      ENDOSCOPY      LUMBAR DISCECTOMY Right 12/19/2022    Procedure: Lumbar Microscopic Discectomy, right lumbar 5 sacral 1;  Surgeon: Yogesh Moran MD;  Location: [facility];  Service: Neurosurgery;  Laterality: Right;     Family History   Problem Relation Age of Onset    Arthritis Sister     Sudden death Paternal Aunt     Early death Paternal Aunt         Age 24    Cancer Maternal Grandfather          Stomach cancer    Heart disease Paternal Grandfather 84    Malig Hyperthermia Neg Hx     Colon cancer Neg Hx         Current Medications       Current Outpatient Medications:     Ascorbic Acid (Vitamin C) 125 MG chewable tablet, Chew Daily., Disp: , Rfl:     chlorpheniramine (CHLOR-TRIMETON) 4 MG tablet, Take 1 tablet by mouth Every 6 (Six) Hours As Needed for Allergies., Disp: , Rfl:     EPINEPHrine (EPIPEN) 0.3 MG/0.3ML solution auto-injector injection, , Disp: , Rfl:     magnesium oxide (MAG-OX) 400 MG tablet, Take 1 tablet by mouth Daily., Disp: , Rfl:     multivitamin (THERAGRAN) tablet tablet, Take 1 tablet by mouth Daily., Disp: , Rfl:     sucralfate (CARAFATE) 1 g tablet, Take 1 tablet by mouth 4 (Four) Times a Day., Disp: , Rfl:     Turmeric 500 MG capsule, Take 2 capsules by mouth 2 (Two) Times a Day., Disp: , Rfl:     vitamin D3 125 MCG (5000 UT) capsule capsule, Take 1 capsule by mouth Daily., Disp: , Rfl:     colestipol (COLESTID) 1 g tablet, Take 1-2 tablets by mouth 2 (Two) Times a Day. (Patient not taking: Reported on 10/22/2024), Disp: 120 tablet, Rfl: 1    pantoprazole (PROTONIX) 40 MG EC tablet, Take 1 tablet by mouth 2 (Two) Times a Day., Disp: 60 tablet, Rfl: 0     Allergies     No Known Allergies    Social History       Social History     Social History Narrative    Not on file         Objective       /74 (BP Location: Left arm, Patient Position: Sitting, Cuff Size: Adult)   Pulse 62   Wt 75.3 kg (166 lb)   SpO2 99%   BMI 21.90 kg/m²       Physical Exam  HENT:      Head: Normocephalic.   Eyes:      Pupils: Pupils are equal, round, and reactive to light.   Cardiovascular:      Rate and Rhythm: Normal rate.   Pulmonary:      Effort: Pulmonary effort is normal.   Abdominal:      General: Bowel sounds are normal.   Musculoskeletal:         General: Normal range of motion.   Neurological:      General: No focal deficit present.      Mental Status: He is alert.   Psychiatric:         Mood  "and Affect: Mood normal.         Results       Result Review :    The following data was reviewed by: JOSÉ ANTONIO Wan on 10/22/2024:    Lab Results - Last 18 Months   Lab Units 10/06/24  1902 06/20/24  1328   WBC 10*3/mm3 5.41 5.11   HEMOGLOBIN g/dL 15.8 15.4   MCV fL 88.4 87.6   PLATELETS 10*3/mm3 175 174         Lab Results - Last 18 Months   Lab Units 10/06/24  1902 06/20/24  1329   BUN mg/dL 13 15   CREATININE mg/dL 1.04 0.97   SODIUM mmol/L 139 138   POTASSIUM mmol/L 4.8 4.3   CHLORIDE mmol/L 101 102   CO2 mmol/L 27.8 27.0   GLUCOSE mg/dL 87 136*      No results for input(s): \"PROTIME\", \"INR\", \"PTT\" in the last 58184 hours.  Lab Results - Last 18 Months   Lab Units 10/06/24  1902 06/20/24  1329   AST (SGOT) U/L 17 18   ALT (SGPT) U/L 17 25   ALK PHOS U/L 66 61   BILIRUBIN mg/dL 0.6 0.6   TOTAL PROTEIN g/dL 7.2 6.7   ALBUMIN g/dL 4.6 4.6      No results for input(s): \"IRON\", \"TRANSFERRIN\", \"TIBC\", \"FERRITIN\", \"[id number]\", \"FOLATE\" in the last 77042 hours.  No results for input(s): \"HAV\", \"HEPAIGM\", \"HEPBIGM\", \"HEPBCAB\", \"HBEAG\", \"HEPCAB\" in the last 58173 hours.    XR Chest 1 View    Result Date: 10/6/2024  No acute infiltrate is appreciated.    Portions of this note were completed with a voice recognition program.  Electronically Signed: Paulie Cifuentes MD  10/6/2024 7:12 PM EDT  Workstation ID: [id number]    XR Spine Lumbar 2 or 3 View    Result Date: 7/24/2024  Impression: Mild L5-S1 degenerative disc disease. No acute osseous abnormality of the lumbar spine. Electronically Signed: Francesca Tyler MD  7/24/2024 1:33 PM EDT  Workstation ID: [id number]           Assessment and Plan              Diagnoses and all orders for this visit:    1. Gastroesophageal reflux disease, unspecified whether esophagitis present (Primary)  -     Case Request; Standing  -     Case Request  -     pantoprazole (PROTONIX) 40 MG EC tablet; Take 1 tablet by mouth 2 (Two) Times a Day.  Dispense: 60 tablet; Refill: 0    2. " Current use of proton pump inhibitor  -     Case Request; Standing  -     Case Request    Other orders  -     Follow Anesthesia Guidelines / Protocol; Standing  -     Follow Anesthesia Guidelines / Protocol; Future  -     Verify NPO; Standing          ESOPHAGOGASTRODUODENOSCOPY; A flexible tube that has a camera and light at the end will pass through the oral cavity into the esophagus (food tube), stomach and upper part of the small bowel.    Follow Up     Follow Up   No follow-ups on file.    EGD ordered due to GERD despite PPI use. I have recommended that the patient undergo further evaluation with an EGD.  I have discussed this procedure in detail with the patient.  I have discussed the risks, benefits, and alternatives.  I have discussed the risk of anesthesia, bleeding and perforation.  Patient understands these risks, benefits, and alternatives and wishes to proceed.  I will schedule him at his earliest convenience. Increased Protonix 40 mg to twice a day.  Continue Carafate 1gm, prior to eating. Stool studied ordered to rule out infectious or inflammatory causes of the diarrhea.    Patient was given instructions and counseling regarding his condition or for health maintenance advice. Please see specific information pulled into the AVS if appropriate.

## 2024-10-25 ENCOUNTER — OFFICE VISIT (OUTPATIENT)
Dept: CARDIOLOGY | Facility: CLINIC | Age: 44
End: 2024-10-25
Payer: COMMERCIAL

## 2024-10-25 VITALS
WEIGHT: 164.6 LBS | HEIGHT: 73 IN | DIASTOLIC BLOOD PRESSURE: 70 MMHG | SYSTOLIC BLOOD PRESSURE: 107 MMHG | HEART RATE: 66 BPM | BODY MASS INDEX: 21.81 KG/M2

## 2024-10-25 DIAGNOSIS — R07.89 CHEST PAIN, ATYPICAL: Primary | ICD-10-CM

## 2024-10-25 RX ORDER — NITROGLYCERIN 0.4 MG/1
0.8 TABLET SUBLINGUAL
OUTPATIENT
Start: 2024-10-25

## 2024-10-25 RX ORDER — METOPROLOL TARTRATE 25 MG/1
50 TABLET, FILM COATED ORAL ONCE
OUTPATIENT
Start: 2024-10-25

## 2024-10-25 RX ORDER — SODIUM CHLORIDE 0.9 % (FLUSH) 0.9 %
10 SYRINGE (ML) INJECTION AS NEEDED
OUTPATIENT
Start: 2024-10-25

## 2024-10-25 RX ORDER — METOPROLOL TARTRATE 50 MG
TABLET ORAL
Qty: 2 TABLET | Refills: 0 | Status: SHIPPED | OUTPATIENT
Start: 2024-10-25

## 2024-10-25 RX ORDER — METOPROLOL TARTRATE 25 MG/1
100 TABLET, FILM COATED ORAL ONCE
OUTPATIENT
Start: 2024-10-25

## 2024-10-25 RX ORDER — METOPROLOL TARTRATE 50 MG
50 TABLET ORAL
OUTPATIENT
Start: 2024-10-25

## 2024-10-25 RX ORDER — METOPROLOL TARTRATE 25 MG/1
25 TABLET, FILM COATED ORAL ONCE
OUTPATIENT
Start: 2024-10-25

## 2024-10-25 RX ORDER — METOPROLOL TARTRATE 1 MG/ML
5 INJECTION, SOLUTION INTRAVENOUS
OUTPATIENT
Start: 2024-10-25

## 2024-10-25 RX ORDER — NITROGLYCERIN 0.4 MG/1
0.4 TABLET SUBLINGUAL
OUTPATIENT
Start: 2024-10-25 | End: 2024-10-25

## 2024-10-25 RX ORDER — METOPROLOL TARTRATE 25 MG/1
200 TABLET, FILM COATED ORAL ONCE
OUTPATIENT
Start: 2024-10-25 | End: 2024-10-25

## 2024-10-25 RX ORDER — SODIUM CHLORIDE 0.9 % (FLUSH) 0.9 %
10 SYRINGE (ML) INJECTION EVERY 12 HOURS SCHEDULED
OUTPATIENT
Start: 2024-10-25

## 2024-10-25 RX ORDER — METOPROLOL TARTRATE 25 MG/1
150 TABLET, FILM COATED ORAL ONCE
OUTPATIENT
Start: 2024-10-25

## 2024-10-25 NOTE — TELEPHONE ENCOUNTER
Patient seen today. Dr Calderon ordered CTA coronary. Per Dr Calderon CA will need to be completed prior to clearance

## 2024-10-25 NOTE — PROGRESS NOTES
[facility]  Interventional Cardiology Patient Visit Note      Referring Provider:  Michela Ramey, APRN  [id number]  [address] [address]    Reason for Referral:   Chest pain  Clearance for EGD    History of Presenting Illness:  History of Present Illness  The patient presents for evaluation of chest pain.    Two weeks ago, he experienced a sudden, intense shock-like sensation during sleep, which he initially attributed to his known condition of GERD. However, the sensation persisted, accompanied by constant chest pain, which led him to seek emergency care. Despite conservative measures, the pain, located centrally in his chest, did not radiate to his neck or arm. He started taking pantoprazole 20 mg twice daily, but it did not alleviate the pain. He also took turmeric supplements. At the ER, an ECG and CBC were performed, and he was given four baby aspirins.  Pain is described as stabbing and constant in nature with no correlation to meals, respiration, positional changes.    Patient was recently seen by gastroenterologist who has suggested suggested an upper scope procedure.     Additionally, He reports a sensation of a hard heartbeat at rest and has had episodes of lightheadedness. He is physically active, engaging in paddle sports and swimming, and does not experience chest pain during these activities. He has not been as active this year due to the chest pain, and tremulousness. He walks up and down a hill 6 to 8 times daily without experiencing chest pain, although he does report a fast heartbeat and mild shortness of breath. He reports no leg swelling or shortness of breath at night. His average heart rate is 60, and his resting heart rate is 59. He has low blood pressure and reports no history of diabetes or stroke.    He has a history of gastrointestinal issues and has made dietary changes, including consuming lasagna the night before the incident. He sleeps  in an elevated position due to seasonal allergies. He has a history of inflammation and is scheduled for an endoscopy on 11/20/2024.    He has a history of tremors and loss of balance, which have improved, but he still experiences internal tremors and vibrations. He has a history of migraines and has seen a neurologist for this issue. He occasionally experiences sharp pain and takes magnesium oxide for relief.    He had lumbar surgery done and recently visited a neurosurgeon due to neck issues. An MRI revealed 2 bulging disks in his neck. He had surgery on his lumbar region for a ruptured disc.    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 He smokes marijuana a few times a week at night. He does not smoke cigarettes. He does not drink alcohol.    FAMILY HISTORY  His aunt passed away suddenly at the age of 24. His grandfather has a pacemaker and he is 94.    ALLERGIES  He has SEASONAL allergies.    Past Medical History  Past Medical History:   Diagnosis Date    Abdominal pain     being followed by GI MD    Abnormal ECG 1/1/2018    Acute gastritis without hemorrhage     Atypical chest pain     Back pain     Chest pain due to GERD     IBS (irritable bowel syndrome)     Loose stools     Lumbar herniated disc     L5-S1    Lumbosacral disc disease 7/16/22    Migraine     MRSA (methicillin resistant Staphylococcus aureus) 8/1/2006    MRSA infection 2006    [facility] in [facility]   abdomin following gallbladder surgery    RLS (restless legs syndrome)          Current Outpatient Medications:     Ascorbic Acid (Vitamin C) 125 MG chewable tablet, Chew Daily., Disp: , Rfl:     chlorpheniramine (CHLOR-TRIMETON) 4 MG tablet, Take 1 tablet by mouth Every 6 (Six) Hours As Needed for Allergies., Disp: , Rfl:     EPINEPHrine (EPIPEN) 0.3 MG/0.3ML solution auto-injector injection, , Disp: , Rfl:     magnesium oxide (MAG-OX) 400 MG tablet, Take 1 tablet by mouth Daily., Disp: , Rfl:     multivitamin  "(THERAGRAN) tablet tablet, Take 1 tablet by mouth Daily., Disp: , Rfl:     pantoprazole (PROTONIX) 40 MG EC tablet, Take 1 tablet by mouth 2 (Two) Times a Day., Disp: 60 tablet, Rfl: 0    sucralfate (CARAFATE) 1 g tablet, Take 1 tablet by mouth 4 (Four) Times a Day., Disp: , Rfl:     Turmeric 500 MG capsule, Take 2 capsules by mouth 2 (Two) Times a Day., Disp: , Rfl:     vitamin D3 125 MCG (5000 UT) capsule capsule, Take 1 capsule by mouth Daily., Disp: , Rfl:     colestipol (COLESTID) 1 g tablet, Take 1-2 tablets by mouth 2 (Two) Times a Day. (Patient not taking: Reported on 10/16/2024), Disp: 120 tablet, Rfl: 1    metoprolol tartrate (LOPRESSOR) 50 MG tablet, Take 50 mg at Bedtime the Night Before Coronary CTA Appointment and In the Morning 1 Hour Prior to Coronary CTA Appointment. Do not take if heart rate less than 60., Disp: 2 tablet, Rfl: 0  Current outpatient and discharge medications have been reconciled for the patient.  Reviewed by: Zeeshan Calderon MD     There are no discontinued medications.  No Known Allergies   Social History     Tobacco Use    Smoking status: Never    Smokeless tobacco: Never    Tobacco comments:     caff use   Vaping Use    Vaping status: Never Used   Substance Use Topics    Alcohol use: Not Currently     Comment: 1 monthly    Drug use: Yes     Frequency: 3.0 times per week     Types: Marijuana     Comment: last 2 nights ago     Family History   Problem Relation Age of Onset    Arthritis Sister     Sudden death Paternal Aunt     Early death Paternal Aunt         Age 24    Cancer Maternal Grandfather         Stomach cancer    Heart disease Paternal Grandfather 84    Malig Hyperthermia Neg Hx     Colon cancer Neg Hx           Objective   /70 (BP Location: Left arm, Patient Position: Sitting, Cuff Size: Adult)   Pulse 66   Ht 185.4 cm (73\")   Wt 74.7 kg (164 lb 9.6 oz)   BMI 21.72 kg/m²     Wt Readings from Last 3 Encounters:   10/25/24 74.7 kg (164 lb 9.6 oz)   10/22/24 75.3 kg " "(166 lb)   10/16/24 75.1 kg (165 lb 8 oz)     BP Readings from Last 3 Encounters:   10/25/24 107/70   10/22/24 121/74   10/16/24 112/62       Physical Exam  Constitutional:  Awake. Not in acute distress. Normal appearance.   Neck: No carotid bruit, hepatojugular reflux or JVD.   Cardiovascular:      Rate and Rhythm: Normal rate and regular rhythm.      Chest Wall: PMI is not displaced.      Heart sounds: Normal heart sounds, S1 normal and S2 normal. No murmur heard.       No friction rub. No gallop. No S3 or S4 sounds.    Pulmonary: Pulmonary effort is normal. Normal breath sounds. No wheezing, rhonchi or rales.   Extremities: No Bilateral edema is noted.   Skin: Warm and dry. Non cyanotic, No petechiae or rash.   Neurological: Alert and oriented x 3  Psychiatric:  Behavior is cooperative.       Result Review :   The following data was reviewed by Zeeshan Calderon MD on 10/25/2024   proBNP   Date Value Ref Range Status   10/06/2024 <36.0 0.0 - 450.0 pg/mL Final     CMP          6/20/2024    13:29 10/6/2024    19:02   CMP   Glucose 136  87    BUN 15  13    Creatinine 0.97  1.04    EGFR 100.0  91.9    Sodium 138  139    Potassium 4.3  4.8    Chloride 102  101    Calcium 9.2  9.5    Total Protein 6.7  7.2    Albumin 4.6  4.6    Globulin 2.1  2.6    Total Bilirubin 0.6  0.6    Alkaline Phosphatase 61  66    AST (SGOT) 18  17    ALT (SGPT) 25  17    Albumin/Globulin Ratio 2.2  1.8    BUN/Creatinine Ratio 15.5  12.5    Anion Gap 9.0  10.2      CBC w/diff          6/20/2024    13:28 10/6/2024    19:02   CBC w/Diff   WBC 5.11  5.41    RBC 5.23  5.41    Hemoglobin 15.4  15.8    Hematocrit 45.8  47.8    MCV 87.6  88.4    MCH 29.4  29.2    MCHC 33.6  33.1    RDW 12.9  12.4    Platelets 174  175    Neutrophil Rel % 73.7  61.8    Immature Granulocyte Rel % 0.2  0.2    Lymphocyte Rel % 18.4  28.8    Monocyte Rel % 5.9  6.8    Eosinophil Rel % 1.2  1.8    Basophil Rel % 0.6  0.6       No results found for: \"TSH\"   No results found " "for: \"FREET4\"   No results found for: \"DDIMERQUANT\"  Magnesium   Date Value Ref Range Status   10/06/2024 2.3 1.6 - 2.6 mg/dL Final      No results found for: \"DIGOXIN\"   Lab Results   Component Value Date    TROPONINT <6 10/06/2024      No results found for: \"POCTROP\"           Results for orders placed during the hospital encounter of 04/19/23    Adult Transthoracic Echo Complete W/ Cont if Necessary Per Protocol    Interpretation Summary    Left ventricular systolic function is normal. Calculated left ventricular EF = 61.2%    Left ventricular diastolic function was normal.    Estimated right ventricular systolic pressure from tricuspid regurgitation is normal (<35 mmHg).     Results for orders placed during the hospital encounter of 04/19/23    Adult Transthoracic Echo Complete W/ Cont if Necessary Per Protocol    Interpretation Summary    Left ventricular systolic function is normal. Calculated left ventricular EF = 61.2%    Left ventricular diastolic function was normal.    Estimated right ventricular systolic pressure from tricuspid regurgitation is normal (<35 mmHg).     No results found for this or any previous visit.        The ASCVD Risk score (Yudi DK, et al., 2019) failed to calculate for the following reasons:    Cannot find a previous HDL lab    Cannot find a previous total cholesterol lab        Assessment  Assessment & Plan  1.  Possible cardiac chest pain  The chest pain is less likely to be cardiac in nature. He has minimal risk factors for coronary artery disease, and his echocardiogram results are satisfactory. The presence of PVCs is not a cause for concern at this time.   A CTA coronaries of the heart will be ordered to further investigate the source of the chest pain. If the scan reveals no ssues, he will be cleared for endoscopy.   Should the CT scan indicate any abnormalities, further investigation will be pursued as necessary.    2.  History of rare PVCs  Patient was reassured that this " shocklike sensation and feeling of sudden pounding sensation in the chest is likely associated with PVCs.  Conservative management is advised.  Holter monitor from 2022 showed overall less than 1% of PVCs of the monitoring time.  No change in pattern has been reported by patient.    He is scheduled for an upper endoscopy on November 20, 2024, to further evaluate his gastrointestinal symptoms.  I suspect that this will be helpful in evaluation of patient's chest pain    Follow-up  Return in 3 weeks for follow up.    Plan                Diagnoses and all orders for this visit:    1. Chest pain, atypical (Primary)  -     CT Angiogram Coronary; Future  -     CT Angiogram Coronary-Cardiology Interpretation; Future  -     metoprolol tartrate (LOPRESSOR) tablet 200 mg  -     metoprolol tartrate (LOPRESSOR) tablet 150 mg  -     metoprolol tartrate (LOPRESSOR) tablet 100 mg  -     metoprolol tartrate (LOPRESSOR) tablet 50 mg  -     metoprolol tartrate (LOPRESSOR) tablet 25 mg  -     metoprolol tartrate (LOPRESSOR) tablet 50 mg  -     metoprolol tartrate (LOPRESSOR) injection 5 mg  -     nitroglycerin (NITROSTAT) SL tablet 0.4 mg  -     nitroglycerin (NITROSTAT) SL tablet 0.8 mg  -     No Caffeine or Nicotine 4 Hours Prior to CTA Appointment  -     Nothing to Eat or Drink 4 Hours Prior to CTA Appointment  -     Do Not Take Phosphodiasterase Inhibitors in the 72 Hours Prior to Coronary CTA  -     Obtain Informed Consent - Computed Tomography Angiography of Chest - Angiogram of Coronary Arteries; Standing  -     Vital Signs Upon Arrival; Standing  -     Cardiac Monitoring; Standing  -     Verify NPO Status - Patient to Be NPO at Least 4 Hours Prior to CTA; Standing  -     Notify CT After Administration of metoprolol tartrate (LOPRESSOR) tablet; Standing  -     Notify Provider If Total Metoprolol Given Equals 300mg & Heart Rate Not At Goal; Standing  -     Notify Provider Prior to Administration of Nitroglycerin if Patient SBP  <80; Standing  -     POC Creatinine; Standing  -     Insert Peripheral IV; Standing  -     Saline Lock & Maintain IV Access; Standing  -     sodium chloride 0.9 % flush 10 mL  -     sodium chloride 0.9 % flush 10 mL  -     Vital Signs - See Instructions; Standing  -     Hold Medication Metformin (Glucophage, Glucophage XR, Fortament, Glumetza);  Metglip (metformin/glipizide);  Glucovance (metformin/glyburide); Avandamet (metformin/rosiglitazone); Standing  -     Patient May Discharge Home After Procedure Complete (If Stable); Standing  -     metoprolol tartrate (LOPRESSOR) 50 MG tablet; Take 50 mg at Bedtime the Night Before Coronary CTA Appointment and In the Morning 1 Hour Prior to Coronary CTA Appointment. Do not take if heart rate less than 60.  Dispense: 2 tablet; Refill: 0      Follow Up     No follow-ups on file.      Zeeshan Calderon MD  Interventional Cardiology  10/25/2024  16:47 EDT      Patient was given instructions and counseling regarding his condition or for health maintenance advice. Please see specific information pulled into the AVS if appropriate.     Please note that portions of this document were completed using a voice recognition program.     Patient or patient representative verbalized consent for the use of Ambient Listening during the visit with  Zeeshan Calderon MD for chart documentation. 10/25/2024  16:53 EDT

## 2024-10-30 ENCOUNTER — LAB (OUTPATIENT)
Dept: LAB | Facility: HOSPITAL | Age: 44
End: 2024-10-30
Payer: COMMERCIAL

## 2024-10-30 DIAGNOSIS — R19.5 LOOSE STOOLS: ICD-10-CM

## 2024-10-30 LAB
027 TOXIN: NORMAL
C DIFF TOX GENS STL QL NAA+PROBE: NEGATIVE
HEMOCCULT STL QL IA: NEGATIVE
LACTOFERRIN STL QL LA: NEGATIVE

## 2024-10-30 PROCEDURE — 87209 SMEAR COMPLEX STAIN: CPT

## 2024-10-30 PROCEDURE — 87177 OVA AND PARASITES SMEARS: CPT

## 2024-10-30 PROCEDURE — 87205 SMEAR GRAM STAIN: CPT

## 2024-10-30 PROCEDURE — 83630 LACTOFERRIN FECAL (QUAL): CPT

## 2024-10-30 PROCEDURE — 87507 IADNA-DNA/RNA PROBE TQ 12-25: CPT

## 2024-10-30 PROCEDURE — 87493 C DIFF AMPLIFIED PROBE: CPT

## 2024-10-30 PROCEDURE — 82274 ASSAY TEST FOR BLOOD FECAL: CPT

## 2024-10-30 PROCEDURE — 83993 ASSAY FOR CALPROTECTIN FECAL: CPT

## 2024-10-31 LAB

## 2024-11-01 ENCOUNTER — TELEPHONE (OUTPATIENT)
Dept: CARDIOLOGY | Facility: CLINIC | Age: 44
End: 2024-11-01
Payer: COMMERCIAL

## 2024-11-01 NOTE — TELEPHONE ENCOUNTER
The [facility] received a fax that requires your attention. The document has been indexed to the patient’s chart for your review.      Reason for sending: EXTERNAL MEDICAL RECORD NOTIFICATION     Documents Description: CT ANGIO HEART [id number]    Name of Sender: [facility]    Date Indexed: 11.1.24

## 2024-11-05 ENCOUNTER — TELEPHONE (OUTPATIENT)
Dept: CARDIOLOGY | Facility: CLINIC | Age: 44
End: 2024-11-05
Payer: COMMERCIAL

## 2024-11-05 DIAGNOSIS — R07.89 CHEST PAIN, ATYPICAL: ICD-10-CM

## 2024-11-05 DIAGNOSIS — R00.2 PALPITATIONS: Primary | ICD-10-CM

## 2024-11-05 LAB
O+P SPEC MICRO: NORMAL
O+P STL CONC: NORMAL
WBC STL QL MICRO: NORMAL
WBC STL QL MICRO: NORMAL

## 2024-11-05 RX ORDER — METOPROLOL TARTRATE 25 MG/1
25 TABLET, FILM COATED ORAL 2 TIMES DAILY
Qty: 50 TABLET | Refills: 3 | Status: SHIPPED | OUTPATIENT
Start: 2024-11-05

## 2024-11-05 NOTE — TELEPHONE ENCOUNTER
I have sent a prescription of metoprolol for you it will relieve your palpitations.  Please take metoprolol as advised for CTA

## 2024-11-06 LAB — CALPROTECTIN STL-MCNT: 41 UG/G (ref 0–120)

## 2024-11-11 NOTE — PAT
Arrival time of 0900  given.    Bring picture ID and insurance care. Have licensed  for transportation home after the procedure.    Nothing to eat or drink after midnight.     Hold all medications the morning of the procedure except nebulizers or inhalers.     Bring mediations and inhalers to hospital with you.     Pt verbalized understanding of instructions.

## 2024-11-15 ENCOUNTER — TELEPHONE (OUTPATIENT)
Dept: CARDIOLOGY | Facility: CLINIC | Age: 44
End: 2024-11-15
Payer: COMMERCIAL

## 2024-11-15 DIAGNOSIS — R07.89 CHEST PAIN, ATYPICAL: ICD-10-CM

## 2024-11-15 NOTE — TELEPHONE ENCOUNTER
----- Message from Delisa DUPREE sent at 11/15/2024 11:49 AM EST -----    ----- Message -----  From: Zeeshan Calderon MD  Sent: 11/15/2024  11:21 AM EST  To: Delisa Larson RN    Your cardiac CT scan showed that you do not have any plaque buildup or calcium buildup in your arteries.  Therefore baby aspirin is not recommended.

## 2024-11-18 ENCOUNTER — ANESTHESIA EVENT (OUTPATIENT)
Dept: GASTROENTEROLOGY | Facility: HOSPITAL | Age: 44
End: 2024-11-18
Payer: COMMERCIAL

## 2024-11-18 RX ORDER — SODIUM CHLORIDE, SODIUM LACTATE, POTASSIUM CHLORIDE, CALCIUM CHLORIDE 600; 310; 30; 20 MG/100ML; MG/100ML; MG/100ML; MG/100ML
30 INJECTION, SOLUTION INTRAVENOUS CONTINUOUS
Status: CANCELLED | OUTPATIENT
Start: 2024-11-18 | End: 2024-11-18

## 2024-11-18 NOTE — ANESTHESIA PREPROCEDURE EVALUATION
Anesthesia Evaluation     Patient summary reviewed and Nursing notes reviewed   NPO Solid Status: > 8 hours  NPO Liquid Status: > 8 hours           Airway   Mallampati: I  TM distance: >3 FB  Neck ROM: full  No difficulty expected  Dental - normal exam     Pulmonary - normal exam    breath sounds clear to auscultation  Cardiovascular - normal exam  Exercise tolerance: good (4-7 METS)    ECG reviewed  Patient on routine beta blocker  Rhythm: regular  Rate: normal      ROS comment: Takes metoprolol     Neuro/Psych  (+) headaches, tremors (nonessential tremors bilateral hands)  GI/Hepatic/Renal/Endo    (+) GERD well controlled    Musculoskeletal     (+) back pain  Abdominal    Substance History      OB/GYN          Other        ROS/Med Hx Other: Hx GERD     EKG 10/6/24: HR 64,   Sinus rhythm  Probable  left atrial enlargement  ST elev, probable normal early repol pattern    ECHO 04/19/23;   Left ventricular systolic function is normal. Calculated left ventricular EF = 61.2%  Left ventricular diastolic function was normal.  Estimated right ventricular systolic pressure from tricuspid regurgitation is normal (<35 mmHg).    Cards clearance with acceptable risks on 11/15/24 per Dr Desir                 Anesthesia Plan    ASA 2     general   total IV anesthesia  (Total IV Anesthesia    Patient understands anesthesia not responsible for dental damage.  )  intravenous induction     Anesthetic plan, risks, benefits, and alternatives have been provided, discussed and informed consent has been obtained with: patient.  Pre-procedure education provided  Plan discussed with CRNA.      CODE STATUS:

## 2024-11-19 ENCOUNTER — TELEPHONE (OUTPATIENT)
Dept: GASTROENTEROLOGY | Facility: CLINIC | Age: 44
End: 2024-11-19
Payer: COMMERCIAL

## 2024-11-19 NOTE — TELEPHONE ENCOUNTER
Hub staff attempted to follow warm transfer process and was unsuccessful     Caller: KAITLIN AMEZCUA    Relationship to patient: SELF    Best call back number: [phone]     Patient is needing: MR. AMEZCUA IS SCHEDULED FOR PROCEDURE ON 11/20/24 AND HAS QUESTIONS REGARDING HIS PREP. PLEASE REVIEW AND ADVISE.    OK TO CALL BACK ANYTIME. OK TO LEAVE .

## 2024-11-20 ENCOUNTER — ANESTHESIA (OUTPATIENT)
Dept: GASTROENTEROLOGY | Facility: HOSPITAL | Age: 44
End: 2024-11-20
Payer: COMMERCIAL

## 2024-11-20 ENCOUNTER — HOSPITAL ENCOUNTER (OUTPATIENT)
Facility: HOSPITAL | Age: 44
Setting detail: HOSPITAL OUTPATIENT SURGERY
Discharge: HOME | End: 2024-11-20
Attending: INTERNAL MEDICINE | Admitting: INTERNAL MEDICINE
Payer: COMMERCIAL

## 2024-11-20 VITALS
DIASTOLIC BLOOD PRESSURE: 74 MMHG | OXYGEN SATURATION: 94 % | SYSTOLIC BLOOD PRESSURE: 107 MMHG | HEART RATE: 68 BPM | WEIGHT: 165.34 LBS | BODY MASS INDEX: 22.4 KG/M2 | HEIGHT: 72 IN | TEMPERATURE: 97.4 F | RESPIRATION RATE: 11 BRPM

## 2024-11-20 DIAGNOSIS — K21.9 GASTROESOPHAGEAL REFLUX DISEASE, UNSPECIFIED WHETHER ESOPHAGITIS PRESENT: ICD-10-CM

## 2024-11-20 DIAGNOSIS — Z79.899 CURRENT USE OF PROTON PUMP INHIBITOR: ICD-10-CM

## 2024-11-20 PROCEDURE — 25010000002 PROPOFOL 10 MG/ML EMULSION: Performed by: NURSE ANESTHETIST, CERTIFIED REGISTERED

## 2024-11-20 PROCEDURE — 43239 EGD BIOPSY SINGLE/MULTIPLE: CPT | Performed by: INTERNAL MEDICINE

## 2024-11-20 PROCEDURE — 88305 TISSUE EXAM BY PATHOLOGIST: CPT | Performed by: INTERNAL MEDICINE

## 2024-11-20 PROCEDURE — 25010000002 LIDOCAINE PF 2% 2 % SOLUTION: Performed by: NURSE ANESTHETIST, CERTIFIED REGISTERED

## 2024-11-20 PROCEDURE — 25810000003 LACTATED RINGERS PER 1000 ML: Performed by: NURSE ANESTHETIST, CERTIFIED REGISTERED

## 2024-11-20 RX ORDER — LIDOCAINE HYDROCHLORIDE 20 MG/ML
INJECTION, SOLUTION EPIDURAL; INFILTRATION; INTRACAUDAL; PERINEURAL AS NEEDED
Status: DISCONTINUED | OUTPATIENT
Start: 2024-11-20 | End: 2024-11-20 | Stop reason: SURG

## 2024-11-20 RX ORDER — PROPOFOL 10 MG/ML
VIAL (ML) INTRAVENOUS AS NEEDED
Status: DISCONTINUED | OUTPATIENT
Start: 2024-11-20 | End: 2024-11-20 | Stop reason: SURG

## 2024-11-20 RX ORDER — DEXMEDETOMIDINE HYDROCHLORIDE 100 UG/ML
INJECTION, SOLUTION INTRAVENOUS AS NEEDED
Status: DISCONTINUED | OUTPATIENT
Start: 2024-11-20 | End: 2024-11-20 | Stop reason: SURG

## 2024-11-20 RX ORDER — SODIUM CHLORIDE, SODIUM LACTATE, POTASSIUM CHLORIDE, CALCIUM CHLORIDE 600; 310; 30; 20 MG/100ML; MG/100ML; MG/100ML; MG/100ML
INJECTION, SOLUTION INTRAVENOUS CONTINUOUS PRN
Status: DISCONTINUED | OUTPATIENT
Start: 2024-11-20 | End: 2024-11-20 | Stop reason: SURG

## 2024-11-20 RX ADMIN — PROPOFOL 50 MG: 10 INJECTION, EMULSION INTRAVENOUS at 12:56

## 2024-11-20 RX ADMIN — PROPOFOL 200 MCG/KG/MIN: 10 INJECTION, EMULSION INTRAVENOUS at 12:54

## 2024-11-20 RX ADMIN — LIDOCAINE HYDROCHLORIDE 100 MG: 20 INJECTION, SOLUTION INTRAVENOUS at 12:54

## 2024-11-20 RX ADMIN — PROPOFOL 100 MG: 10 INJECTION, EMULSION INTRAVENOUS at 12:54

## 2024-11-20 RX ADMIN — DEXMEDETOMIDINE HYDROCHLORIDE 10 MCG: 100 INJECTION, SOLUTION, CONCENTRATE INTRAVENOUS at 12:59

## 2024-11-20 RX ADMIN — PROPOFOL 50 MG: 10 INJECTION, EMULSION INTRAVENOUS at 12:57

## 2024-11-20 RX ADMIN — SODIUM CHLORIDE, POTASSIUM CHLORIDE, SODIUM LACTATE AND CALCIUM CHLORIDE: 600; 310; 30; 20 INJECTION, SOLUTION INTRAVENOUS at 12:49

## 2024-11-20 RX ADMIN — DEXMEDETOMIDINE HYDROCHLORIDE 10 MCG: 100 INJECTION, SOLUTION, CONCENTRATE INTRAVENOUS at 13:00

## 2024-11-20 NOTE — ANESTHESIA POSTPROCEDURE EVALUATION
Patient: Enoch Goddard    Procedure Summary       Date: 11/20/24 Room / Location: [facility] ENDOSCOPY 5 / [facility] END[facility]COPY    Anesthesia Start: 1249 Anesthesia Stop: 1312    Procedure: ESOPHAGOGASTRODUODENOSCOPY bx forcep Diagnosis:       Gastroesophageal reflux disease, unspecified whether esophagitis present      Current use of proton pump inhibitor      (Gastroesophageal reflux disease, unspecified whether esophagitis present [K21.9])      (Current use of proton pump inhibitor [Z79.899])    Surgeons: Patrick Reece MD Provider: Rita Swann CRNA    Anesthesia Type: general ASA Status: 2            Anesthesia Type: general    Vitals  Vitals Value Taken Time   /74 11/20/24 1327   Temp 36.3 °C (97.4 °F) 11/20/24 1327   Pulse 62 11/20/24 1330   Resp 11 11/20/24 1327   SpO2 93 % 11/20/24 1330   Vitals shown include unfiled device data.        Post Anesthesia Care and Evaluation    Post-procedure mental status: acceptable.  Pain management: satisfactory to patient    Airway patency: patent  Anesthetic complications: No anesthetic complications    Cardiovascular status: acceptable  Respiratory status: acceptable    Comments: Per chart review

## 2024-11-22 LAB
CYTO UR: NORMAL
LAB AP CASE REPORT: NORMAL
LAB AP CLINICAL INFORMATION: NORMAL
PATH REPORT.FINAL DX SPEC: NORMAL
PATH REPORT.GROSS SPEC: NORMAL

## 2024-11-26 ENCOUNTER — OFFICE VISIT (OUTPATIENT)
Dept: GASTROENTEROLOGY | Facility: CLINIC | Age: 44
End: 2024-11-26
Payer: COMMERCIAL

## 2024-11-26 VITALS
DIASTOLIC BLOOD PRESSURE: 70 MMHG | BODY MASS INDEX: 22.78 KG/M2 | SYSTOLIC BLOOD PRESSURE: 110 MMHG | WEIGHT: 168 LBS | HEART RATE: 65 BPM

## 2024-11-26 DIAGNOSIS — K21.9 GASTROESOPHAGEAL REFLUX DISEASE, UNSPECIFIED WHETHER ESOPHAGITIS PRESENT: ICD-10-CM

## 2024-11-26 DIAGNOSIS — R19.5 LOOSE STOOLS: Primary | ICD-10-CM

## 2024-11-26 RX ORDER — VONOPRAZAN FUMARATE 13.36 MG/1
10 TABLET ORAL TAKE AS DIRECTED
Qty: 15 TABLET | Refills: 0 | COMMUNITY
Start: 2024-11-26

## 2024-11-26 RX ORDER — PHYTONADIONE 5 MG/1
5 TABLET ORAL ONCE
COMMUNITY

## 2024-11-26 NOTE — PROGRESS NOTES
"Chief Complaint     EGD and Follow-up (PT states his PCP gave him samples of voquenza to try and it seems to be working )    History of Present Illness     Enoch Goddard is a 44 y.o. male who presents to [facility] for follow-up after EGD.    History of present illness:  Patient established care on 9/9/2024 for GERD. Patient has struggled with gastrointestinal trouble over the past decade.  He states it began years ago with chest and arm pain. Patient was evaluted in the ED and states his heart was noted to be fine. It was determined to be GI related and was placed on different trials of PPIs with no improvement. Reports that three months ago he started having tremors in his hands and feet,light headedness, as well as trouble with his vision.      Over the last week noticed increased nausea and dizziness.Denies vomiting, unintentional weight loss, or dysphagia. No reports of hematemesis. He reports his typical bowel regimen consists of formed stool and then followed by watery bowel movements. Denies mucus, oily, bloody, or black stool. No abdominal pain noted. Previous EGD/Colonoscopy 6/10/2021 at [facility] possible sliding hiatal hernia and esophagitis. Duodenitis. Esophageal manometry was normal.    10/22/2024 Office Visit: Patient explains that the evening before he went to the ED, he had lasagna for dinner. He woke from his sleep with chest pain he described as \"a shock to his chest\". While at the ED, an EKG was performed and determined to be borderline. A referral was placed for Cardiology and Gastroenterology at discharge and was started on Esomeprazole 20 mg twice a day. He was then seen by his PCP who started him on Protonix and Carafate. He has been on the Protonix for a week and a half. He continues to complain of chest pain, located at the center of his chest, radiating to his back. He describes the pain as a constant searing pressure. He states the pain is " worse a few hours after eating. Patient reports decreased pain since starting the Carafate and Protonix. Patient started taking supplements named Tumeric and Boswellia. Patient has had the pain over 2 weeks and states it feels different than in June 2021 when he had esophagitis and duodenitis.      Reports 1 BM a day, stool described as loose and brown in color. No blood or black stool. He has an appointment with a Cardiologist on friday. EKG findings of sinus rhythm and left atrial enlargement. EGD ordered. Increased Protonix 40 mg to twice a day.  Continue Carafate 1gm, prior to eating. Stool studied ordered to rule out infectious or inflammatory causes of the diarrhea.     11/20/2024 EGD: Gastritis, Negative results for H. Pylori, metaplasia, dysplasia and malignancy.  No repeat EGD recommended at this time.      11/26/2024 Office Visit: Patient present to the GI clinic to discuss the results of the EGD. He completed the course of Carafate.  Was started on Voquenza and stopped the Protonix four days ago. He reports prior to the Voquezna he felt symptoms were improving. Over the last couple weeks reports feeling like food get hung and increased belching after the procedure, but have since improved.     Reports of loose stools. No reports of hematochezia or melena. He reports about 2 and a half weeks ago he about he had another episode of the pain that awakens him in the middle of the night. Since he has an EKG and ECHO, but both were normal. He currently has a Holter monitor in place but no repeat occurrence since.     Of note patient reports after his procedure he had several cuts on the inside his mouth and a busted lip.      History      Past Medical History:   Diagnosis Date    Abdominal pain     being followed by GI MD    Abnormal ECG 1/1/2018    Acute gastritis without hemorrhage     Atypical chest pain     Back pain     Chest pain due to GERD     IBS (irritable bowel syndrome)     Loose stools     Lumbar  herniated disc     L5-S1    Lumbosacral disc disease 7/16/22    Migraine     MRSA (methicillin resistant Staphylococcus aureus) 8/1/2006    MRSA infection 2006    [facility] in [facility]   abdomin following gallbladder surgery    RLS (restless legs syndrome)      Past Surgical History:   Procedure Laterality Date    BACK SURGERY  12/19/22    CHOLECYSTECTOMY      COLONOSCOPY      ENDOSCOPY      ENDOSCOPY N/A 11/20/2024    Procedure: ESOPHAGOGASTRODUODENOSCOPY bx forcep;  Surgeon: Patrick Reece MD;  Location: [facility] ENDOSCOPY;  Service: Gastroenterology;  Laterality: N/A;  esophagitis, gastritis    LUMBAR DISCECTOMY Right 12/19/2022    Procedure: Lumbar Microscopic Discectomy, right lumbar 5 sacral 1;  Surgeon: Yogesh Moran MD;  Location: [facility] MAIN OR;  Service: Neurosurgery;  Laterality: Right;     Family History   Problem Relation Age of Onset    Arthritis Sister     Sudden death Paternal Aunt     Early death Paternal Aunt         Age 24    Cancer Maternal Grandfather         Stomach cancer    Heart disease Paternal Grandfather 84    Malig Hyperthermia Neg Hx     Colon cancer Neg Hx         Current Medications       Current Outpatient Medications:     Ascorbic Acid (Vitamin C) 125 MG chewable tablet, Chew Daily., Disp: , Rfl:     chlorpheniramine (CHLOR-TRIMETON) 4 MG tablet, Take 1 tablet by mouth Every 6 (Six) Hours As Needed for Allergies., Disp: , Rfl:     magnesium oxide (MAG-OX) 400 MG tablet, Take 1 tablet by mouth Daily., Disp: , Rfl:     multivitamin (THERAGRAN) tablet tablet, Take 1 tablet by mouth Daily., Disp: , Rfl:     NON FORMULARY, BOSWEILLA, Disp: , Rfl:     phytonadione (MEPHYTON, VITAMIN K) 5 MG tablet, Take 1 tablet by mouth 1 (One) Time., Disp: , Rfl:     Turmeric 500 MG capsule, Take 2 capsules by mouth 2 (Two) Times a Day., Disp: , Rfl:     vitamin D3 125 MCG (5000 UT) capsule capsule, Take 1 capsule by mouth Daily., Disp: , Rfl:     EPINEPHrine (EPIPEN) 0.3  "MG/0.3ML solution auto-injector injection, , Disp: , Rfl:     pantoprazole (PROTONIX) 40 MG EC tablet, Take 1 tablet by mouth 2 (Two) Times a Day. (Patient not taking: Reported on 11/26/2024), Disp: 60 tablet, Rfl: 0    sucralfate (CARAFATE) 1 g tablet, Take 1 tablet by mouth 4 (Four) Times a Day. (Patient not taking: Reported on 11/26/2024), Disp: , Rfl:     Vonoprazan Fumarate (Voquezna) 10 MG tablet, Take 1 tablet by mouth Take As Directed., Disp: 15 tablet, Rfl: 0     Allergies     No Known Allergies    Social History       Social History     Social History Narrative    Not on file         Objective       /70 (BP Location: Right arm, Patient Position: Sitting, Cuff Size: Adult)   Pulse 65   Wt 76.2 kg (168 lb)   BMI 22.78 kg/m²       Physical Exam  HENT:      Head: Normocephalic.   Cardiovascular:      Rate and Rhythm: Normal rate.   Pulmonary:      Effort: Pulmonary effort is normal.   Musculoskeletal:         General: Normal range of motion.   Neurological:      General: No focal deficit present.      Mental Status: He is alert.   Psychiatric:         Mood and Affect: Mood normal.         Results       Result Review :    The following data was reviewed by: JOSÉ ANTONIO Wan on 11/26/2024:    Lab Results - Last 18 Months   Lab Units 10/06/24  1902 06/20/24  1328   WBC 10*3/mm3 5.41 5.11   HEMOGLOBIN g/dL 15.8 15.4   MCV fL 88.4 87.6   PLATELETS 10*3/mm3 175 174         Lab Results - Last 18 Months   Lab Units 10/06/24  1902 06/20/24  1329   BUN mg/dL 13 15   CREATININE mg/dL 1.04 0.97   SODIUM mmol/L 139 138   POTASSIUM mmol/L 4.8 4.3   CHLORIDE mmol/L 101 102   CO2 mmol/L 27.8 27.0   GLUCOSE mg/dL 87 136*      No results for input(s): \"PROTIME\", \"INR\", \"PTT\" in the last 50510 hours.  Lab Results - Last 18 Months   Lab Units 10/06/24  1902 06/20/24  1329   AST (SGOT) U/L 17 18   ALT (SGPT) U/L 17 25   ALK PHOS U/L 66 61   BILIRUBIN mg/dL 0.6 0.6   TOTAL PROTEIN g/dL 7.2 6.7   ALBUMIN g/dL " "4.6 4.6      No results for input(s): \"IRON\", \"TRANSFERRIN\", \"TIBC\", \"FERRITIN\", \"UWOUPOVZ02\", \"FOLATE\" in the last 98695 hours.  No results for input(s): \"HAV\", \"HEPAIGM\", \"HEPBIGM\", \"HEPBCAB\", \"HBEAG\", \"HEPCAB\" in the last 25999 hours.    XR Chest 1 View    Result Date: 10/6/2024  No acute infiltrate is appreciated.    Portions of this note were completed with a voice recognition program.  Electronically Signed: Paulie Cifuentes MD  10/6/2024 7:12 PM EDT  Workstation ID: [id number]           Assessment and Plan              Diagnoses and all orders for this visit:    1. Loose stools (Primary)  -     Pancreatic Elastase, Fecal - Stool, Per Rectum; Future    2. Gastroesophageal reflux disease, unspecified whether esophagitis present    Other orders  -     Vonoprazan Fumarate (Voquezna) 10 MG tablet; Take 1 tablet by mouth Take As Directed.  Dispense: 15 tablet; Refill: 0          * Surgery not found *    Follow Up     Follow Up   Return in about 3 months (around 2/26/2025).    Pancreatic elastase ordered to evaluate for Exocrine Pancreatic Insufficiency.  Provided patient with 2 week samples of Voquezna 10 mg.     Patient was given instructions and counseling regarding his condition or for health maintenance advice. Please see specific information pulled into the AVS if appropriate.     "

## 2024-12-12 ENCOUNTER — TELEPHONE (OUTPATIENT)
Dept: GASTROENTEROLOGY | Facility: CLINIC | Age: 44
End: 2024-12-12
Payer: COMMERCIAL

## 2024-12-12 NOTE — TELEPHONE ENCOUNTER
Per JOSÉ ANTONIO Wan it is okay to give pt a 2 week supply of Voquezna         Enoch Wellsith Nitza 1980 was notified that a sample of Voquezna which was prescribed by JOSÉ ANTONIO Wan is ready for . Patient is aware that we will only hold this medication for 2 weeks.

## 2024-12-17 ENCOUNTER — TELEPHONE (OUTPATIENT)
Dept: GASTROENTEROLOGY | Facility: CLINIC | Age: 44
End: 2024-12-17
Payer: COMMERCIAL

## 2025-01-13 RX ORDER — SUCRALFATE 1 G/1
TABLET ORAL
Qty: 120 TABLET | Refills: 0 | Status: SHIPPED | OUTPATIENT
Start: 2025-01-13

## 2025-01-17 ENCOUNTER — TELEPHONE (OUTPATIENT)
Dept: GASTROENTEROLOGY | Facility: CLINIC | Age: 45
End: 2025-01-17
Payer: COMMERCIAL

## 2025-01-17 NOTE — TELEPHONE ENCOUNTER
Contacted pt regarding OD labs, while on the phone pt stated he was having severe acid reflux and chest pain and requested a sooner appt. Advised pt we had a sooner appt in [place], pt was agreeable to the appt at the [place] location, r/s pt agreeable to new date and time.

## 2025-01-24 ENCOUNTER — OFFICE VISIT (OUTPATIENT)
Age: 45
End: 2025-01-24
Payer: COMMERCIAL

## 2025-01-24 VITALS
HEART RATE: 69 BPM | SYSTOLIC BLOOD PRESSURE: 129 MMHG | BODY MASS INDEX: 23.3 KG/M2 | DIASTOLIC BLOOD PRESSURE: 69 MMHG | WEIGHT: 172 LBS | OXYGEN SATURATION: 98 % | HEIGHT: 72 IN

## 2025-01-24 DIAGNOSIS — R07.89 ATYPICAL CHEST PAIN: ICD-10-CM

## 2025-01-24 DIAGNOSIS — K21.9 GASTROESOPHAGEAL REFLUX DISEASE, UNSPECIFIED WHETHER ESOPHAGITIS PRESENT: Primary | ICD-10-CM

## 2025-01-24 PROCEDURE — 99213 OFFICE O/P EST LOW 20 MIN: CPT

## 2025-01-24 RX ORDER — VONOPRAZAN FUMARATE 13.36 MG/1
10 TABLET ORAL DAILY
Qty: 30 TABLET | Refills: 5 | Status: SHIPPED | OUTPATIENT
Start: 2025-01-24

## 2025-01-24 RX ORDER — SUCRALFATE 1 G/1
1 TABLET ORAL 4 TIMES DAILY
Qty: 120 TABLET | Refills: 5 | Status: SHIPPED | OUTPATIENT
Start: 2025-01-24

## 2025-01-24 NOTE — PROGRESS NOTES
"Chief Complaint     Follow-up (acid reflux and chest pain )    History of Present Illness     Enoch Goddard is a 44 y.o. male who presents to [facility] for follow-up of acid reflux and chest pain.    History of present illness:    11/26/2024 Office Visit: Patient present to the GI clinic to discuss the results of the EGD. He completed the course of Carafate.  Was started on Voquenza and stopped the Protonix four days ago. He reports prior to the Voquezna he felt symptoms were improving. Over the last couple weeks reports feeling like food get hung and increased belching after the procedure, but have since improved.      Reports of loose stools. No reports of hematochezia or melena. He reports about 2 and a half weeks ago he about he had another episode of the pain that awakens him in the middle of the night. Since he has an EKG and ECHO, but both were normal. He currently has a Holter monitor in place but no repeat occurrence since. Pancreatic elastase ordered to evaluate for Exocrine Pancreatic Insufficiency. Provided patient with 2 week samples of Voquezna 10 mg.     11/20/2024 EGD: Gastritis, Negative results for H. Pylori, metaplasia, dysplasia and malignancy.  No repeat EGD recommended at this time.      10/22/2024 Office Visit: Patient explains that the evening before he went to the ED, he had lasagna for dinner. He woke from his sleep with chest pain he described as \"a shock to his chest\". While at the ED, an EKG was performed and determined to be borderline. A referral was placed for Cardiology and Gastroenterology at discharge and was started on Esomeprazole 20 mg twice a day. He was then seen by his PCP who started him on Protonix and Carafate. He has been on the Protonix for a week and a half. He continues to complain of chest pain, located at the center of his chest, radiating to his back. He describes the pain as a constant searing pressure. He states the pain is " worse a few hours after eating. Patient reports decreased pain since starting the Carafate and Protonix. Patient started taking supplements named Tumeric and Boswellia. Patient has had the pain over 2 weeks and states it feels different than in June 2021 when he had esophagitis and duodenitis.      Reports 1 BM a day, stool described as loose and brown in color. No blood or black stool. He has an appointment with a Cardiologist on friday. EKG findings of sinus rhythm and left atrial enlargement. EGD ordered. Increased Protonix 40 mg to twice a day.  Continue Carafate 1gm, prior to eating. Stool studied ordered to rule out infectious or inflammatory causes of the diarrhea.     9/9/2024 initial office visit: Patient established care on for GERD. Patient has struggled with gastrointestinal trouble over the past decade.  He states it began years ago with chest and arm pain. Patient was evaluted in the ED and states his heart was noted to be fine. It was determined to be GI related and was placed on different trials of PPIs with no improvement. Reports that three months ago he started having tremors in his hands and feet,light headedness, as well as trouble with his vision.      Over the last week noticed increased nausea and dizziness.Denies vomiting, unintentional weight loss, or dysphagia. No reports of hematemesis. He reports his typical bowel regimen consists of formed stool and then followed by watery bowel movements. Denies mucus, oily, bloody, or black stool. No abdominal pain noted. Previous EGD/Colonoscopy 6/10/2021 at [facility] Dr Braun possible sliding hiatal hernia and esophagitis. Duodenitis. Esophageal manometry was normal.       1/24/2025 Follow-up office visit: Patient explained he continues to take Voquezna and his symptoms were doing well for a while. Until one day he decided to drink a large glass of chocolate milk. Reports he immediately he felt sick to his stomach, and the next day started having  "\"chest pain\". Patient states the chest pain continued for a week, and is now intermittent. He denies it being cardiac related, he explains he had been evaluated by a cardiologist and states he was told it was not his heart. Patient explains he noticed an improvement in symptoms once restarting Carafate. States he feels his heartburn is tapering down as long as he takes the medication as scheduled. No reported other upper or lower GI complaints reported.     History      Past Medical History:   Diagnosis Date    Abdominal pain     being followed by GI MD    Abnormal ECG 1/1/2018    Acute gastritis without hemorrhage     Atypical chest pain     Back pain     Chest pain due to GERD     IBS (irritable bowel syndrome)     Loose stools     Lumbar herniated disc     L5-S1    Lumbosacral disc disease 7/16/22    Migraine     MRSA (methicillin resistant Staphylococcus aureus) 8/1/2006    MRSA infection 2006    [facility]   abdomin following gallbladder surgery    RLS (restless legs syndrome)      Past Surgical History:   Procedure Laterality Date    BACK SURGERY  12/19/22    CHOLECYSTECTOMY      COLONOSCOPY      ENDOSCOPY      ENDOSCOPY N/A 11/20/2024    Procedure: ESOPHAGOGASTRODUODENOSCOPY bx forcep;  Surgeon: Patrick Reece MD;  Location: [facility] ENDOSCOPY;  Service: Gastroenterology;  Laterality: N/A;  esophagitis, gastritis    LUMBAR DISCECTOMY Right 12/19/2022    Procedure: Lumbar Microscopic Discectomy, right lumbar 5 sacral 1;  Surgeon: Yogesh Moran MD;  Location: [facility] OR;  Service: Neurosurgery;  Laterality: Right;     Family History   Problem Relation Age of Onset    Arthritis Sister     Sudden death Paternal Aunt     Early death Paternal Aunt         Age 24    Cancer Maternal Grandfather         Stomach cancer    Heart disease Paternal Grandfather 84    Malig Hyperthermia Neg Hx     Colon cancer Neg Hx         Current Medications       Current Outpatient Medications:    " " Ascorbic Acid (Vitamin C) 125 MG chewable tablet, Chew Daily., Disp: , Rfl:     chlorpheniramine (CHLOR-TRIMETON) 4 MG tablet, Take 1 tablet by mouth Every 6 (Six) Hours As Needed for Allergies., Disp: , Rfl:     magnesium oxide (MAG-OX) 400 MG tablet, Take 1 tablet by mouth Daily., Disp: , Rfl:     melatonin 5 MG tablet tablet, Take  by mouth., Disp: , Rfl:     multivitamin (THERAGRAN) tablet tablet, Take 1 tablet by mouth Daily., Disp: , Rfl:     NON FORMULARY, BOSWEILLA, Disp: , Rfl:     phytonadione (MEPHYTON, VITAMIN K) 5 MG tablet, Take 1 tablet by mouth 1 (One) Time., Disp: , Rfl:     sucralfate (CARAFATE) 1 g tablet, Take 1 tablet by mouth 4 (Four) Times a Day., Disp: 120 tablet, Rfl: 5    Turmeric 500 MG capsule, Take 2 capsules by mouth 2 (Two) Times a Day., Disp: , Rfl:     vitamin D3 125 MCG (5000 UT) capsule capsule, Take 1 capsule by mouth Daily., Disp: , Rfl:     EPINEPHrine (EPIPEN) 0.3 MG/0.3ML solution auto-injector injection, , Disp: , Rfl:     Vonoprazan Fumarate (Voquezna) 10 MG tablet, Take 1 tablet by mouth Daily., Disp: 30 tablet, Rfl: 5     Allergies     No Known Allergies    Social History       Social History     Social History Narrative    Not on file         Objective       /69 (Patient Position: Sitting, Cuff Size: Adult)   Pulse 69   Ht 182.9 cm (72.01\")   Wt 78 kg (172 lb)   SpO2 98%   BMI 23.32 kg/m²       Physical Exam  HENT:      Head: Normocephalic.   Cardiovascular:      Rate and Rhythm: Normal rate.   Pulmonary:      Effort: Pulmonary effort is normal.   Musculoskeletal:         General: Normal range of motion.   Neurological:      General: No focal deficit present.      Mental Status: He is alert.   Psychiatric:         Mood and Affect: Mood normal.         Results       Result Review :    The following data was reviewed by: JOSÉ ANTONIO Wan on 01/24/2025:    Lab Results - Last 18 Months   Lab Units 10/06/24  1902 06/20/24  1328   WBC 10*3/mm3 5.41 5.11 " "  HEMOGLOBIN g/dL 15.8 15.4   MCV fL 88.4 87.6   PLATELETS 10*3/mm3 175 174         Lab Results - Last 18 Months   Lab Units 10/06/24  1902 06/20/24  1329   BUN mg/dL 13 15   CREATININE mg/dL 1.04 0.97   SODIUM mmol/L 139 138   POTASSIUM mmol/L 4.8 4.3   CHLORIDE mmol/L 101 102   CO2 mmol/L 27.8 27.0   GLUCOSE mg/dL 87 136*      No results for input(s): \"PROTIME\", \"INR\", \"PTT\" in the last 40189 hours.  Lab Results - Last 18 Months   Lab Units 10/06/24  1902 06/20/24  1329   AST (SGOT) U/L 17 18   ALT (SGPT) U/L 17 25   ALK PHOS U/L 66 61   BILIRUBIN mg/dL 0.6 0.6   TOTAL PROTEIN g/dL 7.2 6.7   ALBUMIN g/dL 4.6 4.6      No results for input(s): \"IRON\", \"TRANSFERRIN\", \"TIBC\", \"FERRITIN\", \"[id number]\", \"FOLATE\" in the last 02766 hours.  No results for input(s): \"HAV\", \"HEPAIGM\", \"HEPBIGM\", \"HEPBCAB\", \"HBEAG\", \"HEPCAB\" in the last 78227 hours.    XR Chest 1 View    Result Date: 10/6/2024  No acute infiltrate is appreciated.    Portions of this note were completed with a voice recognition program.  Electronically Signed: Paulie Cifuentes MD  10/6/2024 7:12 PM EDT  Workstation ID: [id number]           Assessment and Plan              Diagnoses and all orders for this visit:    1. Gastroesophageal reflux disease, unspecified whether esophagitis present (Primary)  -     Vonoprazan Fumarate (Voquezna) 10 MG tablet; Take 1 tablet by mouth Daily.  Dispense: 30 tablet; Refill: 5  -     sucralfate (CARAFATE) 1 g tablet; Take 1 tablet by mouth 4 (Four) Times a Day.  Dispense: 120 tablet; Refill: 5    2. Atypical chest pain          * Surgery not found *    Follow Up     Follow Up   Return in about 3 months (around 4/24/2025) for GERD.    Although patient states he was evaluated by a cardiologist, we discussed the importance of always going to be evaluated in the ER for chest pain. Advised the patient to go to the ER for further evaluation. Both GERD and heart attack can cause pain in the chest. If chest pain spreads to other areas of " "the body such as the arms or jaw, or has other symptoms such as shortness of breath, chest tightening or squeezing, might be signs of a heart attack. Advised patient to avoid drinking chocolate milk and other known triggers of GERD. Continue Voquezna and Carafate for GERD. Patient explains his \"chest pain\" improved since the addition of Carafate. Advised the patient if chest pain worsens to not delay and be first evaluated in the ER prior to seeing Gastroenterology. Patient verbalizes understanding. Patient states he has an appointment with his PCP scheduled.    Patient was given instructions and counseling regarding his condition or for health maintenance advice. Please see specific information pulled into the AVS if appropriate.     "

## 2025-01-27 ENCOUNTER — TELEPHONE (OUTPATIENT)
Dept: GASTROENTEROLOGY | Facility: CLINIC | Age: 45
End: 2025-01-27
Payer: COMMERCIAL

## 2025-01-27 NOTE — TELEPHONE ENCOUNTER
PA for Voquezna DENIED, sent appeal to insurance company on 1.27.25   Key: [id number]    Request Reference Number: [id number]. VOQUEZNA TAB 10MG is denied for not meeting the prior authorization requirement(s). Details of this decision are in the notice attached below or have been faxed to you.

## 2025-01-28 DIAGNOSIS — K21.9 GASTROESOPHAGEAL REFLUX DISEASE, UNSPECIFIED WHETHER ESOPHAGITIS PRESENT: ICD-10-CM

## 2025-01-28 RX ORDER — VONOPRAZAN FUMARATE 13.36 MG/1
10 TABLET ORAL DAILY
Qty: 30 TABLET | Refills: 5 | Status: SHIPPED | OUTPATIENT
Start: 2025-01-28

## 2025-01-29 ENCOUNTER — OFFICE VISIT (OUTPATIENT)
Dept: CARDIOLOGY | Facility: CLINIC | Age: 45
End: 2025-01-29
Payer: COMMERCIAL

## 2025-01-29 VITALS
HEIGHT: 72 IN | SYSTOLIC BLOOD PRESSURE: 125 MMHG | WEIGHT: 169.6 LBS | DIASTOLIC BLOOD PRESSURE: 75 MMHG | HEART RATE: 64 BPM | BODY MASS INDEX: 22.97 KG/M2

## 2025-01-29 DIAGNOSIS — R00.2 PALPITATIONS: Primary | ICD-10-CM

## 2025-01-29 NOTE — PROGRESS NOTES
[facility]  Interventional Cardiology Patient Visit Note      Referring Provider:  No referring provider defined for this encounter.         History of Presenting Illness:  History of Present Illness    Mr. Goddard is a 44-year-old male who has past medical history of GERD was initially seen by me for evaluation of chest discomfort presents today for follow-up.    In brief, for evaluation of chest pain he underwent coronary CTA which was notable for calcium score of 0 and no plaque buildup in the coronary arteries.  There were no coronary anomalies that were noted in the CT either by report.  However patient continues to experience episodes of chest discomfort without warning signs.    Today, he reports experiencing severe chest pain, described as a squeezing sensation, predominantly on the left side of his chest. This pain does not radiate to his arm or jaw and can occur at any time during the day without any identifiable triggers. He also describes episodes of intense chest pain accompanied by palpitations, which have been recurrent over the past few days. He recalls an incident this morning where he was awakened by the sensation of his head moving in rhythm with his heartbeat. He has been experiencing chest pain since Sunday night, persisting through the subsequent days and nights. He also reports difficulty breathing and shoulder pain but does not believe these symptoms are indicative of a heart attack. His current heart rate is 74, with a resting rate of 59. He maintains a strict diet, avoiding red meat, grains, and dairy, and consuming a high volume of vegetables. He reports no history of diabetes, smoking, or familial predisposition to cardiac conditions. He is not currently on any cholesterol-lowering medications and reports no history of hypertension. He has previously sought emergency care for similar symptoms, where he underwent EKG and chest x-ray examinations before  being discharged. He expresses a desire to understand the cause of his symptoms and to ensure his health is not compromised.    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  Patient's job is stressful as per him.    SOCIAL HISTORY  He does not smoke.    FAMILY HISTORY  He reports no family history of heart disease.    MEDICATIONS  Magnesium      Past Medical History  Past Medical History:   Diagnosis Date    Abdominal pain     being followed by GI MD    Abnormal ECG 1/1/2018    Acute gastritis without hemorrhage     Atypical chest pain     Back pain     Chest pain due to GERD     IBS (irritable bowel syndrome)     Loose stools     Lumbar herniated disc     L5-S1    Lumbosacral disc disease 7/16/22    Migraine     MRSA (methicillin resistant Staphylococcus aureus) 8/1/2006    MRSA infection 2006    [facility] in [facility]   abdomin following gallbladder surgery    RLS (restless legs syndrome)          Current Outpatient Medications:     Ascorbic Acid (Vitamin C) 125 MG chewable tablet, Chew Daily., Disp: , Rfl:     chlorpheniramine (CHLOR-TRIMETON) 4 MG tablet, Take 1 tablet by mouth Every 6 (Six) Hours As Needed for Allergies., Disp: , Rfl:     EPINEPHrine (EPIPEN) 0.3 MG/0.3ML solution auto-injector injection, , Disp: , Rfl:     magnesium oxide (MAG-OX) 400 MG tablet, Take 1 tablet by mouth Daily., Disp: , Rfl:     melatonin 5 MG tablet tablet, Take  by mouth., Disp: , Rfl:     multivitamin (THERAGRAN) tablet tablet, Take 1 tablet by mouth Daily., Disp: , Rfl:     NON FORMULARY, BOSWEILLA, Disp: , Rfl:     phytonadione (MEPHYTON, VITAMIN K) 5 MG tablet, Take 1 tablet by mouth 1 (One) Time., Disp: , Rfl:     sucralfate (CARAFATE) 1 g tablet, Take 1 tablet by mouth 4 (Four) Times a Day., Disp: 120 tablet, Rfl: 5    Turmeric 500 MG capsule, Take 2 capsules by mouth 2 (Two) Times a Day., Disp: , Rfl:     vitamin D3 125 MCG (5000 UT) capsule capsule, Take 1 capsule by mouth Daily., Disp: , Rfl:     Vonoprazan  "Fumarate (Voquezna) 10 MG tablet, Take 1 tablet by mouth Daily., Disp: 30 tablet, Rfl: 5  Current outpatient and discharge medications have been reconciled for the patient.  Reviewed by: Zeeshan Calderon MD     There are no discontinued medications.  No Known Allergies   Social History     Tobacco Use    Smoking status: Never     Passive exposure: Never    Smokeless tobacco: Never    Tobacco comments:     caff use   Vaping Use    Vaping status: Never Used   Substance Use Topics    Alcohol use: Not Currently     Comment: 1 monthly    Drug use: Yes     Frequency: 3.0 times per week     Types: Marijuana     Comment: last 2 nights ago     Family History   Problem Relation Age of Onset    Arthritis Sister     Sudden death Paternal Aunt     Early death Paternal Aunt         Age 24    Cancer Maternal Grandfather         Stomach cancer    Heart disease Paternal Grandfather 84    Malig Hyperthermia Neg Hx     Colon cancer Neg Hx           Objective   /75 (BP Location: Left arm, Patient Position: Sitting, Cuff Size: Large Adult)   Pulse 64   Ht 182.9 cm (72\")   Wt 76.9 kg (169 lb 9.6 oz)   BMI 23.00 kg/m²     Wt Readings from Last 3 Encounters:   01/29/25 76.9 kg (169 lb 9.6 oz)   01/24/25 78 kg (172 lb)   11/26/24 76.2 kg (168 lb)     BP Readings from Last 3 Encounters:   01/29/25 125/75   01/24/25 129/69   11/26/24 110/70       Physical Exam  Constitutional:  Awake. Not in acute distress. Normal appearance.   Neck: No carotid bruit, hepatojugular reflux or JVD.   Cardiovascular:      Rate and Rhythm: Normal rate and regular rhythm.      Chest Wall: PMI is not displaced.      Heart sounds: Normal heart sounds, S1 normal and S2 normal. No murmur heard.       No friction rub. No gallop. No S3 or S4 sounds.    Pulmonary: Pulmonary effort is normal. Normal breath sounds. No wheezing, rhonchi or rales.   Extremities: No Bilateral edema is noted.   Skin: Warm and dry. Non cyanotic, No petechiae or rash.   Neurological: " "Alert and oriented x 3  Psychiatric:  Behavior is cooperative.       Result Review :   The following data was reviewed by Zeeshan Calderon MD on 01/29/2025   proBNP   Date Value Ref Range Status   10/06/2024 <36.0 0.0 - 450.0 pg/mL Final     CMP          6/20/2024    13:29 10/6/2024    19:02   CMP   Glucose 136  87    BUN 15  13    Creatinine 0.97  1.04    EGFR 100.0  91.9    Sodium 138  139    Potassium 4.3  4.8    Chloride 102  101    Calcium 9.2  9.5    Total Protein 6.7  7.2    Albumin 4.6  4.6    Globulin 2.1  2.6    Total Bilirubin 0.6  0.6    Alkaline Phosphatase 61  66    AST (SGOT) 18  17    ALT (SGPT) 25  17    Albumin/Globulin Ratio 2.2  1.8    BUN/Creatinine Ratio 15.5  12.5    Anion Gap 9.0  10.2      CBC w/diff          6/20/2024    13:28 10/6/2024    19:02   CBC w/Diff   WBC 5.11  5.41    RBC 5.23  5.41    Hemoglobin 15.4  15.8    Hematocrit 45.8  47.8    MCV 87.6  88.4    MCH 29.4  29.2    MCHC 33.6  33.1    RDW 12.9  12.4    Platelets 174  175    Neutrophil Rel % 73.7  61.8    Immature Granulocyte Rel % 0.2  0.2    Lymphocyte Rel % 18.4  28.8    Monocyte Rel % 5.9  6.8    Eosinophil Rel % 1.2  1.8    Basophil Rel % 0.6  0.6       No results found for: \"TSH\"   No results found for: \"FREET4\"   No results found for: \"DDIMERQUANT\"  Magnesium   Date Value Ref Range Status   10/06/2024 2.3 1.6 - 2.6 mg/dL Final      No results found for: \"DIGOXIN\"   Lab Results   Component Value Date    TROPONINT <6 10/06/2024      No results found for: \"POCTROP\"           Results for orders placed during the hospital encounter of 04/19/23    Adult Transthoracic Echo Complete W/ Cont if Necessary Per Protocol    Interpretation Summary    Left ventricular systolic function is normal. Calculated left ventricular EF = 61.2%    Left ventricular diastolic function was normal.    Estimated right ventricular systolic pressure from tricuspid regurgitation is normal (<35 mmHg).     Results for orders placed during the hospital " encounter of 04/19/23    Adult Transthoracic Echo Complete W/ Cont if Necessary Per Protocol    Interpretation Summary    Left ventricular systolic function is normal. Calculated left ventricular EF = 61.2%    Left ventricular diastolic function was normal.    Estimated right ventricular systolic pressure from tricuspid regurgitation is normal (<35 mmHg).     No results found for this or any previous visit.        The ASCVD Risk score (Yudi NUNES, et al., 2019) failed to calculate for the following reasons:    Cannot find a previous HDL lab    Cannot find a previous total cholesterol lab        Assessment  Assessment & Plan  1.  Possible cardiac chest pain  Coronary CTA has been unremarkable for plaque buildup and calcium score.  Based on Alexandre score patient's risk of having future cardiovascular event is low.  However patient continues to get these chest discomfort episodes.  At this juncture, differentials include anxiety disorder coronary microvascular dysfunction and other etiologies.    Patient has experienced indigestion-like symptoms not resolving with over-the-counter medications and does not attribute the symptoms to GERD.  Sometimes the symptoms occur with exertion sometimes they do not.  CMD testing was discussed with patient.  Trials of medical management were also discussed with the patient.  It is also likely that patient symptoms from palpitations.  Therefore informed decision was made to proceed with cardiac rhythm monitoring for 2 weeks and if results are unremarkable we will either proceed with medical management or CMD testing.  Patient is reluctant to try medical management unless absolutely necessary.    2.  Palpitations  Patient was reassured that this shocklike sensation and feeling of sudden pounding sensation in the chest is likely associated with PVCs.  Conservative management is advised.  Holter monitor from 2022 showed overall less than 1% of PVCs of the monitoring time.   Repeating cardiac  rhythm monitoring for reasons stated above.  No change in pattern has been reported by patient.    All questions were answered in detail.    Follow-up  The patient will follow up in 6 months, or earlier if necessary.     I have seen and examined the patient. I spent 30 min caring for the patient on this date of service. This time includes time spent by me in the following activities as necessary: face to face encounter, preparing for the visit, reviewing tests, specialists records and previous visits, obtaining and/or reviewing a separately obtained history, performing a medically appropriate exam and/or evaluation, counseling and educating the patient, family, caregiver, referring and/or communicating with other healthcare professionals, documenting information in the medical record, independently interpreting results and communicating that information with the patient, family, caregiver, and developing a medically appropriate treatment plan with consideration of other conditions, medications, and treatments. More than 50 % time was spent in counselling and patient education.                   Diagnoses and all orders for this visit:    1. Palpitations (Primary)  -     Cardiac Event Monitor (SUSAN) or Mobile Cardiac Outpatient Telemetry (MCT); Future        Follow Up     Return in about 6 months (around 7/29/2025) for With Dr. Calderon.      Zeeshan Calderon MD  Interventional Cardiology  01/29/2025  13:03 EST      Patient was given instructions and counseling regarding his condition or for health maintenance advice. Please see specific information pulled into the AVS if appropriate.     Please note that portions of this document were completed using a voice recognition program.     Patient or patient representative verbalized consent for the use of Ambient Listening during the visit with  Zeeshan Calderon MD for chart documentation. 1/29/2025  16:53 EDT    ROS

## 2025-02-01 ENCOUNTER — TELEPHONE (OUTPATIENT)
Dept: CARDIOLOGY | Facility: CLINIC | Age: 45
End: 2025-02-01
Payer: COMMERCIAL

## 2025-02-01 NOTE — TELEPHONE ENCOUNTER
The [facility] received a fax that requires your attention. The document has been indexed to the patient’s chart for your review.      Reason for sending: EXTERNAL MEDICAL RECORD NOTIFICATION     Documents Description: REMOTE 30 DAY ECG [id number]    Name of Sender: [facility]     Date Indexed: 1.31.25

## 2025-02-04 ENCOUNTER — APPOINTMENT (OUTPATIENT)
Dept: GENERAL RADIOLOGY | Facility: HOSPITAL | Age: 45
End: 2025-02-04
Payer: COMMERCIAL

## 2025-02-04 ENCOUNTER — HOSPITAL ENCOUNTER (EMERGENCY)
Facility: HOSPITAL | Age: 45
Discharge: HOME | End: 2025-02-04
Attending: EMERGENCY MEDICINE | Admitting: EMERGENCY MEDICINE
Payer: COMMERCIAL

## 2025-02-04 VITALS
SYSTOLIC BLOOD PRESSURE: 120 MMHG | HEART RATE: 68 BPM | TEMPERATURE: 98 F | RESPIRATION RATE: 16 BRPM | HEIGHT: 72 IN | DIASTOLIC BLOOD PRESSURE: 78 MMHG | WEIGHT: 169.53 LBS | BODY MASS INDEX: 22.96 KG/M2 | OXYGEN SATURATION: 100 %

## 2025-02-04 DIAGNOSIS — R07.89 CHEST WALL PAIN: Primary | ICD-10-CM

## 2025-02-04 LAB
ALBUMIN SERPL-MCNC: 4.6 G/DL (ref 3.5–5.2)
ALBUMIN/GLOB SERPL: 1.6 G/DL
ALP SERPL-CCNC: 70 U/L (ref 39–117)
ALT SERPL W P-5'-P-CCNC: 25 U/L (ref 1–41)
ANION GAP SERPL CALCULATED.3IONS-SCNC: 8.7 MMOL/L (ref 5–15)
AST SERPL-CCNC: 22 U/L (ref 1–40)
BASOPHILS # BLD AUTO: 0.02 10*3/MM3 (ref 0–0.2)
BASOPHILS NFR BLD AUTO: 0.5 % (ref 0–1.5)
BILIRUB SERPL-MCNC: 0.6 MG/DL (ref 0–1.2)
BUN SERPL-MCNC: 12 MG/DL (ref 6–20)
BUN/CREAT SERPL: 13 (ref 7–25)
CALCIUM SPEC-SCNC: 9.4 MG/DL (ref 8.6–10.5)
CHLORIDE SERPL-SCNC: 104 MMOL/L (ref 98–107)
CO2 SERPL-SCNC: 28.3 MMOL/L (ref 22–29)
CREAT SERPL-MCNC: 0.92 MG/DL (ref 0.76–1.27)
D DIMER PPP FEU-MCNC: <0.27 MCGFEU/ML (ref 0–0.5)
DEPRECATED RDW RBC AUTO: 39.4 FL (ref 37–54)
EGFRCR SERPLBLD CKD-EPI 2021: 106.5 ML/MIN/1.73
EOSINOPHIL # BLD AUTO: 0.04 10*3/MM3 (ref 0–0.4)
EOSINOPHIL NFR BLD AUTO: 0.9 % (ref 0.3–6.2)
ERYTHROCYTE [DISTWIDTH] IN BLOOD BY AUTOMATED COUNT: 13 % (ref 12.3–15.4)
GEN 5 1HR TROPONIN T REFLEX: <6 NG/L
GLOBULIN UR ELPH-MCNC: 2.8 GM/DL
GLUCOSE SERPL-MCNC: 86 MG/DL (ref 65–99)
HCT VFR BLD AUTO: 45.4 % (ref 37.5–51)
HGB BLD-MCNC: 15.6 G/DL (ref 13–17.7)
HOLD SPECIMEN: NORMAL
HOLD SPECIMEN: NORMAL
IMM GRANULOCYTES # BLD AUTO: 0 10*3/MM3 (ref 0–0.05)
IMM GRANULOCYTES NFR BLD AUTO: 0 % (ref 0–0.5)
LYMPHOCYTES # BLD AUTO: 0.86 10*3/MM3 (ref 0.7–3.1)
LYMPHOCYTES NFR BLD AUTO: 19.6 % (ref 19.6–45.3)
MCH RBC QN AUTO: 29.6 PG (ref 26.6–33)
MCHC RBC AUTO-ENTMCNC: 34.4 G/DL (ref 31.5–35.7)
MCV RBC AUTO: 86.1 FL (ref 79–97)
MONOCYTES # BLD AUTO: 0.29 10*3/MM3 (ref 0.1–0.9)
MONOCYTES NFR BLD AUTO: 6.6 % (ref 5–12)
NEUTROPHILS NFR BLD AUTO: 3.18 10*3/MM3 (ref 1.7–7)
NEUTROPHILS NFR BLD AUTO: 72.4 % (ref 42.7–76)
NRBC BLD AUTO-RTO: 0 /100 WBC (ref 0–0.2)
PLATELET # BLD AUTO: 200 10*3/MM3 (ref 140–450)
PMV BLD AUTO: 10.6 FL (ref 6–12)
POTASSIUM SERPL-SCNC: 4.2 MMOL/L (ref 3.5–5.2)
PROT SERPL-MCNC: 7.4 G/DL (ref 6–8.5)
QT INTERVAL: 372 MS
QTC INTERVAL: 409 MS
RBC # BLD AUTO: 5.27 10*6/MM3 (ref 4.14–5.8)
SODIUM SERPL-SCNC: 141 MMOL/L (ref 136–145)
TROPONIN T NUMERIC DELTA: NORMAL
TROPONIN T SERPL HS-MCNC: <6 NG/L
WBC NRBC COR # BLD AUTO: 4.39 10*3/MM3 (ref 3.4–10.8)
WHOLE BLOOD HOLD COAG: NORMAL
WHOLE BLOOD HOLD SPECIMEN: NORMAL

## 2025-02-04 PROCEDURE — 99284 EMERGENCY DEPT VISIT MOD MDM: CPT

## 2025-02-04 PROCEDURE — 93005 ELECTROCARDIOGRAM TRACING: CPT

## 2025-02-04 PROCEDURE — 71045 X-RAY EXAM CHEST 1 VIEW: CPT

## 2025-02-04 PROCEDURE — 84484 ASSAY OF TROPONIN QUANT: CPT

## 2025-02-04 PROCEDURE — 36415 COLL VENOUS BLD VENIPUNCTURE: CPT

## 2025-02-04 PROCEDURE — 93005 ELECTROCARDIOGRAM TRACING: CPT | Performed by: EMERGENCY MEDICINE

## 2025-02-04 PROCEDURE — 84484 ASSAY OF TROPONIN QUANT: CPT | Performed by: EMERGENCY MEDICINE

## 2025-02-04 PROCEDURE — 96372 THER/PROPH/DIAG INJ SC/IM: CPT

## 2025-02-04 PROCEDURE — 80053 COMPREHEN METABOLIC PANEL: CPT

## 2025-02-04 PROCEDURE — 85025 COMPLETE CBC W/AUTO DIFF WBC: CPT

## 2025-02-04 PROCEDURE — 85379 FIBRIN DEGRADATION QUANT: CPT | Performed by: EMERGENCY MEDICINE

## 2025-02-04 PROCEDURE — 25010000002 KETOROLAC TROMETHAMINE PER 15 MG: Performed by: EMERGENCY MEDICINE

## 2025-02-04 RX ORDER — ASPIRIN 325 MG
325 TABLET ORAL ONCE
Status: DISCONTINUED | OUTPATIENT
Start: 2025-02-04 | End: 2025-02-04

## 2025-02-04 RX ORDER — SODIUM CHLORIDE 0.9 % (FLUSH) 0.9 %
10 SYRINGE (ML) INJECTION AS NEEDED
Status: DISCONTINUED | OUTPATIENT
Start: 2025-02-04 | End: 2025-02-04 | Stop reason: HOSPADM

## 2025-02-04 RX ORDER — IBUPROFEN 400 MG/1
400 TABLET, FILM COATED ORAL EVERY 6 HOURS PRN
Qty: 10 TABLET | Refills: 0 | Status: SHIPPED | OUTPATIENT
Start: 2025-02-04

## 2025-02-04 RX ORDER — KETOROLAC TROMETHAMINE 30 MG/ML
30 INJECTION, SOLUTION INTRAMUSCULAR; INTRAVENOUS ONCE
Status: COMPLETED | OUTPATIENT
Start: 2025-02-04 | End: 2025-02-04

## 2025-02-04 RX ADMIN — KETOROLAC TROMETHAMINE 30 MG: 30 INJECTION, SOLUTION INTRAMUSCULAR at 14:31

## 2025-02-04 NOTE — ED PROVIDER NOTES
EMERGENCY DEPARTMENT ENCOUNTER    Room Number:  31/31  PCP: Michela Ramey APRN  Historian: Patient      HPI:  Chief Complaint: Chest pain  A complete HPI/ROS/PMH/PSH/SH/FH are unobtainable due to: None   Context: Enoch Goddard is a 44 y.o. male who presents to the ED c/o chest pain.  Patient with history of reflux as well as pleurisy presents for evaluation of chest pain.  Patient states chest pain started last night.  States it is sharp chest pain that goes through to his back.  States his chest wall was tender to touch.  Patient has had no leg swelling.  Patient has had mild cough.  No fevers or chills.  Patient states has been seen by cardiology in the past.  Had a coronary CT that was negative.            PAST MEDICAL HISTORY  Active Ambulatory Problems     Diagnosis Date Noted    Status post lumbar surgery 12/29/2022    Lumbosacral disc disease 07/16/2022    Gastroesophageal reflux disease 10/22/2024    Current use of proton pump inhibitor 10/22/2024     Resolved Ambulatory Problems     Diagnosis Date Noted    Lumbar disc herniation with radiculopathy 11/29/2022     Past Medical History:   Diagnosis Date    Abdominal pain     Abnormal ECG 1/1/2018    Acute gastritis without hemorrhage     Atypical chest pain     Back pain     Chest pain due to GERD     IBS (irritable bowel syndrome)     Loose stools     Lumbar herniated disc     Migraine     MRSA (methicillin resistant Staphylococcus aureus) 8/1/2006    MRSA infection 2006    RLS (restless legs syndrome)          PAST SURGICAL HISTORY  Past Surgical History:   Procedure Laterality Date    BACK SURGERY  12/19/22    CHOLECYSTECTOMY      COLONOSCOPY      ENDOSCOPY      ENDOSCOPY N/A 11/20/2024    Procedure: ESOPHAGOGASTRODUODENOSCOPY bx forcep;  Surgeon: Patrick Reece MD;  Location: [facility] ENDOSCOPY;  Service: Gastroenterology;  Laterality: N/A;  esophagitis, gastritis    LUMBAR DISCECTOMY Right 12/19/2022    Procedure: Lumbar Microscopic Discectomy,  right lumbar 5 sacral 1;  Surgeon: Yogesh Moran MD;  Location: [facility] OR;  Service: Neurosurgery;  Laterality: Right;         FAMILY HISTORY  Family History   Problem Relation Age of Onset    Arthritis Sister     Sudden death Paternal Aunt     Early death Paternal Aunt         Age 24    Cancer Maternal Grandfather         Stomach cancer    Heart disease Paternal Grandfather 84    Malig Hyperthermia Neg Hx     Colon cancer Neg Hx          SOCIAL HISTORY  Social History     Socioeconomic History    Marital status:    Tobacco Use    Smoking status: Never     Passive exposure: Never    Smokeless tobacco: Never    Tobacco comments:     caff use   Vaping Use    Vaping status: Never Used   Substance and Sexual Activity    Alcohol use: Not Currently     Comment: 1 monthly    Drug use: Yes     Frequency: 3.0 times per week     Types: Marijuana     Comment: last 2 nights ago    Sexual activity: Yes     Partners: Female     Birth control/protection: None         ALLERGIES  Patient has no known allergies.        REVIEW OF SYSTEMS  Review of Systems   Chest pain      PHYSICAL EXAM  ED Triage Vitals   Temp Heart Rate Resp BP SpO2   02/04/25 1305 02/04/25 1305 02/04/25 1305 02/04/25 1319 02/04/25 1305   98 °F (36.7 °C) 84 16 119/79 99 %      Temp src Heart Rate Source Patient Position BP Location FiO2 (%)   02/04/25 1305 02/04/25 1305 -- -- --   Tympanic Monitor          Physical Exam      GENERAL: no acute distress  HENT: nares patent  EYES: no scleral icterus  CV: regular rhythm, normal rate  RESPIRATORY: normal effort  ABDOMEN: soft  MUSCULOSKELETAL: no deformity.  Tenderness to chest wall  NEURO: alert, moves all extremities, follows commands  PSYCH:  calm, cooperative  SKIN: warm, dry    Vital signs and nursing notes reviewed.          LAB RESULTS  Recent Results (from the past 24 hours)   ECG 12 Lead ED Triage Standing Order; Chest Pain    Collection Time: 02/04/25  1:16 PM   Result Value Ref Range     QT Interval 372 ms    QTC Interval 409 ms   Comprehensive Metabolic Panel    Collection Time: 02/04/25  1:24 PM    Specimen: Arm, Left; Blood   Result Value Ref Range    Glucose 86 65 - 99 mg/dL    BUN 12 6 - 20 mg/dL    Creatinine 0.92 0.76 - 1.27 mg/dL    Sodium 141 136 - 145 mmol/L    Potassium 4.2 3.5 - 5.2 mmol/L    Chloride 104 98 - 107 mmol/L    CO2 28.3 22.0 - 29.0 mmol/L    Calcium 9.4 8.6 - 10.5 mg/dL    Total Protein 7.4 6.0 - 8.5 g/dL    Albumin 4.6 3.5 - 5.2 g/dL    ALT (SGPT) 25 1 - 41 U/L    AST (SGOT) 22 1 - 40 U/L    Alkaline Phosphatase 70 39 - 117 U/L    Total Bilirubin 0.6 0.0 - 1.2 mg/dL    Globulin 2.8 gm/dL    A/G Ratio 1.6 g/dL    BUN/Creatinine Ratio 13.0 7.0 - 25.0    Anion Gap 8.7 5.0 - 15.0 mmol/L    eGFR 106.5 >60.0 mL/min/1.73   High Sensitivity Troponin T    Collection Time: 02/04/25  1:24 PM    Specimen: Arm, Left; Blood   Result Value Ref Range    HS Troponin T <6 <22 ng/L   Green Top (Gel)    Collection Time: 02/04/25  1:24 PM   Result Value Ref Range    Extra Tube Hold for add-ons.    Lavender Top    Collection Time: 02/04/25  1:24 PM   Result Value Ref Range    Extra Tube hold for add-on    Gold Top - SST    Collection Time: 02/04/25  1:24 PM   Result Value Ref Range    Extra Tube Hold for add-ons.    Light Blue Top    Collection Time: 02/04/25  1:24 PM   Result Value Ref Range    Extra Tube Hold for add-ons.    CBC Auto Differential    Collection Time: 02/04/25  1:24 PM    Specimen: Arm, Left; Blood   Result Value Ref Range    WBC 4.39 3.40 - 10.80 10*3/mm3    RBC 5.27 4.14 - 5.80 10*6/mm3    Hemoglobin 15.6 13.0 - 17.7 g/dL    Hematocrit 45.4 37.5 - 51.0 %    MCV 86.1 79.0 - 97.0 fL    MCH 29.6 26.6 - 33.0 pg    MCHC 34.4 31.5 - 35.7 g/dL    RDW 13.0 12.3 - 15.4 %    RDW-SD 39.4 37.0 - 54.0 fl    MPV 10.6 6.0 - 12.0 fL    Platelets 200 140 - 450 10*3/mm3    Neutrophil % 72.4 42.7 - 76.0 %    Lymphocyte % 19.6 19.6 - 45.3 %    Monocyte % 6.6 5.0 - 12.0 %    Eosinophil % 0.9 0.3 -  6.2 %    Basophil % 0.5 0.0 - 1.5 %    Immature Grans % 0.0 0.0 - 0.5 %    Neutrophils, Absolute 3.18 1.70 - 7.00 10*3/mm3    Lymphocytes, Absolute 0.86 0.70 - 3.10 10*3/mm3    Monocytes, Absolute 0.29 0.10 - 0.90 10*3/mm3    Eosinophils, Absolute 0.04 0.00 - 0.40 10*3/mm3    Basophils, Absolute 0.02 0.00 - 0.20 10*3/mm3    Immature Grans, Absolute 0.00 0.00 - 0.05 10*3/mm3    nRBC 0.0 0.0 - 0.2 /100 WBC   D-dimer, Quantitative    Collection Time: 02/04/25  1:24 PM    Specimen: Arm, Left; Blood   Result Value Ref Range    D-Dimer, Quantitative <0.27 0.00 - 0.50 MCGFEU/mL   High Sensitivity Troponin T 1Hr    Collection Time: 02/04/25  2:31 PM    Specimen: Blood   Result Value Ref Range    HS Troponin T <6 <22 ng/L    Troponin T Numeric Delta         Ordered the above labs and reviewed the results.        RADIOLOGY  XR Chest 1 View    Result Date: 2/4/2025  XR CHEST 1 VW-  HISTORY: Male who is 44 years-old, chest pain  TECHNIQUE: Frontal view of the chest  COMPARISON: 10/6/2024  FINDINGS: Heart, mediastinum and pulmonary vasculature are unremarkable. No focal pulmonary consolidation, pleural effusion, or pneumothorax. No acute osseous process.      No evidence for acute pulmonary process. Follow-up as clinical indications persist.  This report was finalized on 2/4/2025 1:55 PM by Dr. Cristian Harley M.D on Workstation: [id number]       Ordered the above noted radiological studies.  Chest x-ray independent interpreted by me and shows no evidence of pneumothorax          PROCEDURES  Procedures    EKG          EKG time: 1316  Rhythm/Rate: Normal sinus rhythm 72  P waves and VA: Normal P waves   QRS, axis: Normal QRS  ST and T waves: Nonspecific ST-T wave    Interpreted Contemporaneously by me, independently viewed  Unchanged compared to prior 10/6/2024      MEDICATIONS GIVEN IN ER  Medications   sodium chloride 0.9 % flush 10 mL (has no administration in time range)   ketorolac (TORADOL) injection 30 mg (30 mg  Intramuscular Given 2/4/25 1431)                   MEDICAL DECISION MAKING, PROGRESS, and CONSULTS    All labs have been independently reviewed by me.  All radiology studies have been reviewed by me and I have also reviewed the radiology report.   EKG's independently viewed and interpreted by me.  Discussion below represents my analysis of pertinent findings related to patient's condition, differential diagnosis, treatment plan and final disposition.      Additional sources:  - Discussed/ obtained information from independent historians: None    - External (non-ED) record review: Epic reviewed patient seen by audiology today for dizziness and giddiness    - Chronic or social conditions impacting care: None none    - Shared decision making: None      Orders placed during this visit:  Orders Placed This Encounter   Procedures    XR Chest 1 View    Rawson Draw    Comprehensive Metabolic Panel    High Sensitivity Troponin T    CBC Auto Differential    High Sensitivity Troponin T 1Hr    D-dimer, Quantitative    NPO Diet NPO Type: Strict NPO    Undress & Gown    Continuous Pulse Oximetry    Oxygen Therapy- Nasal Cannula; Titrate 1-6 LPM Per SpO2; 90 - 95%    ECG 12 Lead ED Triage Standing Order; Chest Pain    ECG 12 Lead ED Triage Standing Order; Chest Pain    Insert Peripheral IV    CBC & Differential    Green Top (Gel)    Lavender Top    Gold Top - SST    Light Blue Top         Additional orders considered but not ordered:  none        Differential diagnosis includes but is not limited to:    ACS versus pleurisy versus costochondritis versus reflux versus PE versus pneumonia      Independent interpretation of labs, radiology studies, and discussions with consultants:  ED Course as of 02/04/25 1536   Tue Feb 04, 2025   1528 15:28 EST  Patient with chest pain.  Patient's pain is very atypical.  Patient has had calcium score in the past that was negative.  Patient's chest x-ray shows no evidence of pneumonia or  pneumothorax.  Patient's D-dimer is negative.  Patient serial troponins are normal.  Patient has low heart score.  Will be discharged home.  Instructed to follow-up with primary provider. [SL]      ED Course User Index  [SL] César Valdes MD                 DIAGNOSIS  Final diagnoses:   Chest wall pain         DISPOSITION  DISCHARGE    Patient discharged in stable condition.    Reviewed implications of results, diagnosis, meds, responsibility to follow up, warning signs and symptoms of possible worsening, potential complications and reasons to return to ER, including worsening symptoms    Patient/Family voiced understanding of above instructions.    Discussed plan for discharge, as there is no emergent indication for admission. Patient referred to primary care provider for BP management due to today's BP. Pt/family is agreeable and understands need for follow up and repeat testing.  Pt is aware that discharge does not mean that nothing is wrong but it indicates no emergency is present that requires admission and they must continue care with follow-up as given below or physician of their choice.     FOLLOW-UP  Michela Ramey, APRN  [address]  [address] [address] [phone]    Schedule an appointment as soon as possible for a visit            Medication List        New Prescriptions      ibuprofen 400 MG tablet  Commonly known as: ADVIL,MOTRIN  Take 1 tablet by mouth Every 6 (Six) Hours As Needed for Mild Pain.               Where to Get Your Medications        These medications were sent to [facility] [place] [id number]      Hours: Monday to Friday 7 AM to 6 PM, Saturday & Sunday 8 AM to 4:30 PM (Closed 12 PM to 12:30 PM) Phone: [phone]   ibuprofen 400 MG tablet                  Latest Documented Vital Signs:  As of 15:36 EST  BP- 120/78 HR- 68 Temp- 98 °F (36.7 °C) (Tympanic) O2 sat- 100%              --    Please note that portions of this  were completed with a voice recognition program.       Note Disclaimer: At [facility], we believe that sharing information builds trust and better relationships. You are receiving this note because you are receiving care at [facility] or recently visited. It is possible you will see health information before a provider has talked with you about it. This kind of information can be easy to misunderstand. To help you fully understand what it means for your health, we urge you to discuss this note with your provider.            César Valdes MD  02/04/25 1531

## 2025-04-15 ENCOUNTER — TRANSCRIBE ORDERS (OUTPATIENT)
Dept: ADMINISTRATIVE | Facility: HOSPITAL | Age: 45
End: 2025-04-15
Payer: COMMERCIAL

## 2025-04-15 ENCOUNTER — HOSPITAL ENCOUNTER (OUTPATIENT)
Dept: GENERAL RADIOLOGY | Facility: HOSPITAL | Age: 45
Discharge: HOME | End: 2025-04-15
Payer: COMMERCIAL

## 2025-04-15 DIAGNOSIS — M25.521 RIGHT ELBOW PAIN: ICD-10-CM

## 2025-04-15 DIAGNOSIS — M25.521 RIGHT ELBOW PAIN: Primary | ICD-10-CM

## 2025-04-15 PROCEDURE — 73080 X-RAY EXAM OF ELBOW: CPT

## 2025-04-30 ENCOUNTER — TRANSCRIBE ORDERS (OUTPATIENT)
Dept: ADMINISTRATIVE | Facility: HOSPITAL | Age: 45
End: 2025-04-30
Payer: COMMERCIAL

## 2025-04-30 DIAGNOSIS — M25.521 RIGHT ELBOW PAIN: Primary | ICD-10-CM

## 2025-06-13 ENCOUNTER — TRANSCRIBE ORDERS (OUTPATIENT)
Dept: GENERAL RADIOLOGY | Facility: HOSPITAL | Age: 45
End: 2025-06-13
Payer: COMMERCIAL

## 2025-06-13 ENCOUNTER — HOSPITAL ENCOUNTER (OUTPATIENT)
Dept: GENERAL RADIOLOGY | Facility: HOSPITAL | Age: 45
Discharge: HOME | End: 2025-06-13
Payer: COMMERCIAL

## 2025-06-13 DIAGNOSIS — R07.89 OTHER CHEST PAIN: Primary | ICD-10-CM

## 2025-06-13 DIAGNOSIS — R07.89 OTHER CHEST PAIN: ICD-10-CM

## 2025-06-13 PROCEDURE — 71046 X-RAY EXAM CHEST 2 VIEWS: CPT

## 2025-08-14 ENCOUNTER — OFFICE VISIT (OUTPATIENT)
Dept: CARDIOLOGY | Facility: CLINIC | Age: 45
End: 2025-08-14
Payer: COMMERCIAL

## 2025-08-14 VITALS
HEIGHT: 72 IN | HEART RATE: 68 BPM | WEIGHT: 168 LBS | SYSTOLIC BLOOD PRESSURE: 114 MMHG | DIASTOLIC BLOOD PRESSURE: 73 MMHG | BODY MASS INDEX: 22.75 KG/M2

## 2025-08-14 DIAGNOSIS — R07.89 CHEST PAIN, ATYPICAL: ICD-10-CM

## 2025-08-14 DIAGNOSIS — R00.2 PALPITATIONS: Primary | ICD-10-CM

## 2025-08-14 PROCEDURE — 99214 OFFICE O/P EST MOD 30 MIN: CPT | Performed by: NURSE PRACTITIONER

## (undated) DEVICE — PK NEURO SPINE 40

## (undated) DEVICE — SINGLE-USE BIOPSY FORCEPS: Brand: RADIAL JAW 4

## (undated) DEVICE — LINER SURG CANSTR SXN S/RIGD 1500CC

## (undated) DEVICE — SPNG GZ WOVN 4X4IN 12PLY 10/BX STRL

## (undated) DEVICE — ACCY PA700 LUBRICANT DIFFUSER MR7 4 PACK: Brand: MIDAS REX

## (undated) DEVICE — SUT VIC UD BR COAT OS/4 0 27IN SLVR J694H

## (undated) DEVICE — BLCK/BITE BLOX WO/DENTL/RIM W/STRAP 54F

## (undated) DEVICE — COTTON BALLS, DOUBLE STRUNG: Brand: DEROYAL

## (undated) DEVICE — PATIENT RETURN ELECTRODE, SINGLE-USE, CONTACT QUALITY MONITORING, ADULT, WITH 9FT CORD, FOR PATIENTS WEIGING OVER 33LBS. (15KG): Brand: MEGADYNE

## (undated) DEVICE — DISPOSABLE BIPOLAR CABLE 12FT. (3.6M): Brand: KIRWAN

## (undated) DEVICE — CONN TBG Y 5 IN 1 LF STRL

## (undated) DEVICE — SUT VIC 3/0 X1 27IN J458H

## (undated) DEVICE — SYRINGE, LUER LOCK, 60ML: Brand: MEDLINE

## (undated) DEVICE — NEEDLE, QUINCKE 22GX3.5": Brand: MEDLINE INDUSTRIES, INC.

## (undated) DEVICE — SMOKE EVACUATION TUBING WITH 7/8 IN TO 1/4 IN REDUCER: Brand: BUFFALO FILTER

## (undated) DEVICE — GLV SURG SENSICARE PI LF PF 7.5 GRN STRL

## (undated) DEVICE — GLV SURG SIGNATURE ESSENTIAL PF LTX SZ7.5

## (undated) DEVICE — EXTENSION SET, MALE LUER LOCK ADAPTER WITH RETRACTABLE COLLAR

## (undated) DEVICE — SOL IRRG H2O PL/BG 1000ML STRL

## (undated) DEVICE — DRP MICROSCOPE 4 BINOCULAR CV 54X150IN

## (undated) DEVICE — ANTIBACTERIAL UNDYED BRAIDED (POLYGLACTIN 910), SYNTHETIC ABSORBABLE SUTURE: Brand: COATED VICRYL

## (undated) DEVICE — Device

## (undated) DEVICE — CONN JET HYDRA H20 AUXILIARY DISP

## (undated) DEVICE — Device: Brand: DEFENDO AIR/WATER/SUCTION AND BIOPSY VALVE

## (undated) DEVICE — TOOL MR8-14MH30 MR8 14CM MATCH 3MM: Brand: MIDAS REX MR8

## (undated) DEVICE — SKIN PREP TRAY W/CHG: Brand: MEDLINE INDUSTRIES, INC.

## (undated) DEVICE — 3M™ STERI-STRIP™ ANTIMICROBIAL SKIN CLOSURES 1/2 INCH X 4 INCHES 50/CARTON 4 CARTONS/CASE A1847: Brand: 3M™ STERI-STRIP™

## (undated) DEVICE — PENCL ES MEGADINE EZ/CLEAN BUTN W/HOLSTR 10FT

## (undated) DEVICE — SYR CONTRL PRESS/LO FIX/M/LL W/THMB/RNG 10ML

## (undated) DEVICE — GLV SURG BIOGEL LTX PF 8

## (undated) DEVICE — SOLIDIFIER LIQLOC PLS 1500CC BT

## (undated) DEVICE — GLV SURG BIOGEL LTX PF 7 1/2

## (undated) DEVICE — NEEDLE, QUINCKE, 20GX3.5": Brand: MEDLINE

## (undated) DEVICE — APPL CHLORAPREP HI/LITE 26ML ORNG